# Patient Record
Sex: MALE | Race: WHITE | Employment: OTHER | URBAN - METROPOLITAN AREA
[De-identification: names, ages, dates, MRNs, and addresses within clinical notes are randomized per-mention and may not be internally consistent; named-entity substitution may affect disease eponyms.]

---

## 2017-01-01 ENCOUNTER — APPOINTMENT (OUTPATIENT)
Dept: GENERAL RADIOLOGY | Facility: HOSPITAL | Age: 72
DRG: 309 | End: 2017-01-01
Attending: HOSPITALIST
Payer: MEDICARE

## 2017-01-01 ENCOUNTER — ANESTHESIA (OUTPATIENT)
Dept: ENDOSCOPY | Facility: HOSPITAL | Age: 72
End: 2017-01-01
Payer: MEDICARE

## 2017-01-01 ENCOUNTER — NURSE NAVIGATOR ENCOUNTER (OUTPATIENT)
Dept: HEMATOLOGY/ONCOLOGY | Facility: HOSPITAL | Age: 72
End: 2017-01-01

## 2017-01-01 ENCOUNTER — PATIENT OUTREACH (OUTPATIENT)
Dept: HEMATOLOGY/ONCOLOGY | Facility: HOSPITAL | Age: 72
End: 2017-01-01

## 2017-01-01 ENCOUNTER — TELEPHONE (OUTPATIENT)
Dept: HEMATOLOGY/ONCOLOGY | Facility: HOSPITAL | Age: 72
End: 2017-01-01

## 2017-01-01 ENCOUNTER — HOSPITAL ENCOUNTER (OUTPATIENT)
Dept: CT IMAGING | Age: 72
Discharge: HOME | End: 2017-01-01
Attending: INTERNAL MEDICINE | Admitting: INTERNAL MEDICINE
Payer: MEDICARE

## 2017-01-01 ENCOUNTER — TELEPHONE (OUTPATIENT)
Dept: PULMONOLOGY | Facility: CLINIC | Age: 72
End: 2017-01-01

## 2017-01-01 ENCOUNTER — LAB ENCOUNTER (OUTPATIENT)
Dept: LAB | Age: 72
End: 2017-01-01
Attending: INTERNAL MEDICINE
Payer: MEDICARE

## 2017-01-01 ENCOUNTER — TELEPHONE (OUTPATIENT)
Dept: INTERNAL MEDICINE CLINIC | Facility: CLINIC | Age: 72
End: 2017-01-01

## 2017-01-01 ENCOUNTER — HOSPITAL ENCOUNTER (OUTPATIENT)
Dept: NUCLEAR MEDICINE | Facility: HOSPITAL | Age: 72
Discharge: HOME | End: 2017-01-01
Attending: INTERNAL MEDICINE
Payer: MEDICARE

## 2017-01-01 ENCOUNTER — APPOINTMENT (OUTPATIENT)
Dept: HEMATOLOGY/ONCOLOGY | Facility: HOSPITAL | Age: 72
End: 2017-01-01
Attending: PHYSICIAN ASSISTANT
Payer: MEDICARE

## 2017-01-01 ENCOUNTER — OFFICE VISIT (OUTPATIENT)
Dept: PULMONOLOGY | Facility: CLINIC | Age: 72
End: 2017-01-01

## 2017-01-01 ENCOUNTER — DOCUMENTATION ONLY (OUTPATIENT)
Dept: HEMATOLOGY/ONCOLOGY | Facility: HOSPITAL | Age: 72
End: 2017-01-01

## 2017-01-01 ENCOUNTER — OFFICE VISIT (OUTPATIENT)
Dept: INTERNAL MEDICINE CLINIC | Facility: CLINIC | Age: 72
End: 2017-01-01

## 2017-01-01 ENCOUNTER — APPOINTMENT (OUTPATIENT)
Dept: GENERAL RADIOLOGY | Facility: HOSPITAL | Age: 72
DRG: 309 | End: 2017-01-01
Attending: EMERGENCY MEDICINE
Payer: MEDICARE

## 2017-01-01 ENCOUNTER — SURGERY (OUTPATIENT)
Age: 72
End: 2017-01-01

## 2017-01-01 ENCOUNTER — HOSPITAL ENCOUNTER (OUTPATIENT)
Dept: MRI IMAGING | Age: 72
Discharge: HOME | End: 2017-01-01
Attending: INTERNAL MEDICINE
Payer: MEDICARE

## 2017-01-01 ENCOUNTER — APPOINTMENT (OUTPATIENT)
Dept: CT IMAGING | Facility: HOSPITAL | Age: 72
DRG: 309 | End: 2017-01-01
Attending: EMERGENCY MEDICINE
Payer: MEDICARE

## 2017-01-01 ENCOUNTER — LAB ENCOUNTER (OUTPATIENT)
Dept: LAB | Age: 72
DRG: 309 | End: 2017-01-01
Attending: INTERNAL MEDICINE
Payer: MEDICARE

## 2017-01-01 ENCOUNTER — OFFICE VISIT (OUTPATIENT)
Dept: HEMATOLOGY/ONCOLOGY | Facility: HOSPITAL | Age: 72
End: 2017-01-01
Attending: INTERNAL MEDICINE
Payer: MEDICARE

## 2017-01-01 ENCOUNTER — TELEPHONE (OUTPATIENT)
Dept: OPHTHALMOLOGY | Facility: CLINIC | Age: 72
End: 2017-01-01

## 2017-01-01 ENCOUNTER — HOSPITAL ENCOUNTER (OUTPATIENT)
Dept: RESPIRATORY THERAPY | Facility: HOSPITAL | Age: 72
Discharge: HOME | End: 2017-01-01
Attending: INTERNAL MEDICINE
Payer: MEDICARE

## 2017-01-01 ENCOUNTER — ANESTHESIA EVENT (OUTPATIENT)
Dept: ENDOSCOPY | Facility: HOSPITAL | Age: 72
End: 2017-01-01
Payer: MEDICARE

## 2017-01-01 ENCOUNTER — APPOINTMENT (OUTPATIENT)
Dept: CV DIAGNOSTICS | Facility: HOSPITAL | Age: 72
DRG: 309 | End: 2017-01-01
Attending: EMERGENCY MEDICINE
Payer: MEDICARE

## 2017-01-01 ENCOUNTER — HOSPITAL ENCOUNTER (OUTPATIENT)
Facility: HOSPITAL | Age: 72
Setting detail: HOSPITAL OUTPATIENT SURGERY
Discharge: HOME | End: 2017-01-01
Attending: INTERNAL MEDICINE | Admitting: INTERNAL MEDICINE
Payer: MEDICARE

## 2017-01-01 ENCOUNTER — TELEPHONE (OUTPATIENT)
Dept: CARDIOLOGY UNIT | Facility: HOSPITAL | Age: 72
End: 2017-01-01

## 2017-01-01 ENCOUNTER — HOSPITAL ENCOUNTER (OUTPATIENT)
Dept: GENERAL RADIOLOGY | Age: 72
Discharge: HOME | End: 2017-01-01
Attending: INTERNAL MEDICINE | Admitting: INTERNAL MEDICINE
Payer: MEDICARE

## 2017-01-01 ENCOUNTER — HOSPITAL ENCOUNTER (INPATIENT)
Facility: HOSPITAL | Age: 72
LOS: 3 days | Discharge: NURSING, REHAB OR LONG-TERM CARE | DRG: 309 | End: 2017-01-01
Attending: EMERGENCY MEDICINE | Admitting: HOSPITALIST
Payer: MEDICARE

## 2017-01-01 VITALS
DIASTOLIC BLOOD PRESSURE: 70 MMHG | SYSTOLIC BLOOD PRESSURE: 110 MMHG | BODY MASS INDEX: 23.3 KG/M2 | WEIGHT: 145 LBS | HEART RATE: 112 BPM | HEIGHT: 66 IN | RESPIRATION RATE: 20 BRPM | OXYGEN SATURATION: 96 %

## 2017-01-01 VITALS
SYSTOLIC BLOOD PRESSURE: 137 MMHG | HEART RATE: 70 BPM | BODY MASS INDEX: 21.74 KG/M2 | OXYGEN SATURATION: 94 % | WEIGHT: 138.5 LBS | DIASTOLIC BLOOD PRESSURE: 63 MMHG | TEMPERATURE: 98 F | RESPIRATION RATE: 18 BRPM | HEIGHT: 67 IN

## 2017-01-01 VITALS
BODY MASS INDEX: 24.48 KG/M2 | RESPIRATION RATE: 20 BRPM | HEIGHT: 67 IN | SYSTOLIC BLOOD PRESSURE: 120 MMHG | HEART RATE: 105 BPM | WEIGHT: 156 LBS | DIASTOLIC BLOOD PRESSURE: 76 MMHG | TEMPERATURE: 98 F

## 2017-01-01 VITALS
HEART RATE: 90 BPM | TEMPERATURE: 99 F | OXYGEN SATURATION: 94 % | DIASTOLIC BLOOD PRESSURE: 65 MMHG | BODY MASS INDEX: 23.63 KG/M2 | SYSTOLIC BLOOD PRESSURE: 118 MMHG | WEIGHT: 147 LBS | HEIGHT: 66 IN | RESPIRATION RATE: 26 BRPM

## 2017-01-01 VITALS
TEMPERATURE: 97 F | WEIGHT: 154 LBS | DIASTOLIC BLOOD PRESSURE: 72 MMHG | HEIGHT: 67 IN | RESPIRATION RATE: 20 BRPM | SYSTOLIC BLOOD PRESSURE: 118 MMHG | BODY MASS INDEX: 24.17 KG/M2 | HEART RATE: 90 BPM

## 2017-01-01 VITALS
SYSTOLIC BLOOD PRESSURE: 83 MMHG | TEMPERATURE: 98 F | BODY MASS INDEX: 22.82 KG/M2 | HEIGHT: 66 IN | HEART RATE: 96 BPM | DIASTOLIC BLOOD PRESSURE: 51 MMHG | RESPIRATION RATE: 16 BRPM | WEIGHT: 142 LBS

## 2017-01-01 VITALS
HEART RATE: 98 BPM | SYSTOLIC BLOOD PRESSURE: 144 MMHG | HEIGHT: 66 IN | TEMPERATURE: 98 F | BODY MASS INDEX: 22.66 KG/M2 | WEIGHT: 141 LBS | DIASTOLIC BLOOD PRESSURE: 63 MMHG | OXYGEN SATURATION: 95 % | RESPIRATION RATE: 24 BRPM

## 2017-01-01 VITALS
SYSTOLIC BLOOD PRESSURE: 112 MMHG | RESPIRATION RATE: 16 BRPM | OXYGEN SATURATION: 94 % | DIASTOLIC BLOOD PRESSURE: 66 MMHG | HEART RATE: 99 BPM | WEIGHT: 154 LBS | BODY MASS INDEX: 24.75 KG/M2 | HEIGHT: 66 IN

## 2017-01-01 VITALS
WEIGHT: 145 LBS | OXYGEN SATURATION: 93 % | BODY MASS INDEX: 23.3 KG/M2 | TEMPERATURE: 98 F | RESPIRATION RATE: 23 BRPM | DIASTOLIC BLOOD PRESSURE: 65 MMHG | HEART RATE: 82 BPM | SYSTOLIC BLOOD PRESSURE: 133 MMHG | HEIGHT: 66 IN

## 2017-01-01 VITALS
RESPIRATION RATE: 18 BRPM | DIASTOLIC BLOOD PRESSURE: 43 MMHG | SYSTOLIC BLOOD PRESSURE: 115 MMHG | TEMPERATURE: 98 F | HEART RATE: 78 BPM | OXYGEN SATURATION: 93 %

## 2017-01-01 DIAGNOSIS — E78.00 PURE HYPERCHOLESTEROLEMIA: ICD-10-CM

## 2017-01-01 DIAGNOSIS — C78.7 LIVER METASTASIS (HCC): ICD-10-CM

## 2017-01-01 DIAGNOSIS — J44.9 CHRONIC OBSTRUCTIVE PULMONARY DISEASE, UNSPECIFIED COPD TYPE (HCC): Primary | ICD-10-CM

## 2017-01-01 DIAGNOSIS — C34.91 MALIGNANT NEOPLASM OF RIGHT LUNG, UNSPECIFIED PART OF LUNG (HCC): Primary | ICD-10-CM

## 2017-01-01 DIAGNOSIS — E11.22 TYPE 2 DIABETES MELLITUS WITH CHRONIC KIDNEY DISEASE, WITH LONG-TERM CURRENT USE OF INSULIN, UNSPECIFIED CKD STAGE (HCC): ICD-10-CM

## 2017-01-01 DIAGNOSIS — R77.8 ELEVATED TROPONIN: ICD-10-CM

## 2017-01-01 DIAGNOSIS — R04.2 HEMOPTYSIS: ICD-10-CM

## 2017-01-01 DIAGNOSIS — R91.8 LUNG MASS: ICD-10-CM

## 2017-01-01 DIAGNOSIS — Z23 NEED FOR INFLUENZA VACCINATION: ICD-10-CM

## 2017-01-01 DIAGNOSIS — J44.9 CHRONIC OBSTRUCTIVE PULMONARY DISEASE, UNSPECIFIED COPD TYPE (HCC): ICD-10-CM

## 2017-01-01 DIAGNOSIS — R05.9 COUGH: ICD-10-CM

## 2017-01-01 DIAGNOSIS — R06.00 DOE (DYSPNEA ON EXERTION): ICD-10-CM

## 2017-01-01 DIAGNOSIS — Z23 NEED FOR PNEUMOCOCCAL VACCINATION: ICD-10-CM

## 2017-01-01 DIAGNOSIS — D72.829 LEUKOCYTOSIS, UNSPECIFIED TYPE: ICD-10-CM

## 2017-01-01 DIAGNOSIS — E11.22 TYPE 2 DIABETES MELLITUS WITH CHRONIC KIDNEY DISEASE, WITH LONG-TERM CURRENT USE OF INSULIN, UNSPECIFIED CKD STAGE (HCC): Primary | ICD-10-CM

## 2017-01-01 DIAGNOSIS — D72.829 LEUKOCYTOSIS, UNSPECIFIED TYPE: Primary | ICD-10-CM

## 2017-01-01 DIAGNOSIS — N28.9 FUNCTION KIDNEY DECREASED: Primary | ICD-10-CM

## 2017-01-01 DIAGNOSIS — C34.90 LUNG CANCER (HCC): ICD-10-CM

## 2017-01-01 DIAGNOSIS — H17.9 CORNEAL SCAR, RIGHT EYE: ICD-10-CM

## 2017-01-01 DIAGNOSIS — D50.9 IRON DEFICIENCY ANEMIA, UNSPECIFIED IRON DEFICIENCY ANEMIA TYPE: Primary | ICD-10-CM

## 2017-01-01 DIAGNOSIS — E11.9 TYPE 2 DIABETES MELLITUS WITHOUT COMPLICATION, WITHOUT LONG-TERM CURRENT USE OF INSULIN (HCC): ICD-10-CM

## 2017-01-01 DIAGNOSIS — Z01.00 DIABETIC EYE EXAM (HCC): Primary | ICD-10-CM

## 2017-01-01 DIAGNOSIS — D50.8 OTHER IRON DEFICIENCY ANEMIA: ICD-10-CM

## 2017-01-01 DIAGNOSIS — R06.00 DYSPNEA ON EXERTION: ICD-10-CM

## 2017-01-01 DIAGNOSIS — Z79.4 TYPE 2 DIABETES MELLITUS WITH CHRONIC KIDNEY DISEASE, WITH LONG-TERM CURRENT USE OF INSULIN, UNSPECIFIED CKD STAGE (HCC): ICD-10-CM

## 2017-01-01 DIAGNOSIS — Z79.4 TYPE 2 DIABETES MELLITUS WITH CHRONIC KIDNEY DISEASE, WITH LONG-TERM CURRENT USE OF INSULIN, UNSPECIFIED CKD STAGE (HCC): Primary | ICD-10-CM

## 2017-01-01 DIAGNOSIS — E55.9 VITAMIN D DEFICIENCY: ICD-10-CM

## 2017-01-01 DIAGNOSIS — C34.91 MALIGNANT NEOPLASM OF RIGHT LUNG, UNSPECIFIED PART OF LUNG (HCC): ICD-10-CM

## 2017-01-01 DIAGNOSIS — I48.92 ATRIAL FLUTTER, UNSPECIFIED TYPE (HCC): ICD-10-CM

## 2017-01-01 DIAGNOSIS — Z12.5 SCREENING PSA (PROSTATE SPECIFIC ANTIGEN): ICD-10-CM

## 2017-01-01 DIAGNOSIS — D50.9 IRON DEFICIENCY ANEMIA, UNSPECIFIED IRON DEFICIENCY ANEMIA TYPE: ICD-10-CM

## 2017-01-01 DIAGNOSIS — H26.8 OTHER CATARACT: Primary | ICD-10-CM

## 2017-01-01 DIAGNOSIS — H26.9 CATARACT, UNSPECIFIED CATARACT TYPE, UNSPECIFIED LATERALITY: ICD-10-CM

## 2017-01-01 DIAGNOSIS — Z00.00 MEDICARE ANNUAL WELLNESS VISIT, SUBSEQUENT: ICD-10-CM

## 2017-01-01 DIAGNOSIS — R63.4 WEIGHT LOSS: ICD-10-CM

## 2017-01-01 DIAGNOSIS — Z95.5 S/P CORONARY ARTERY STENT PLACEMENT: ICD-10-CM

## 2017-01-01 DIAGNOSIS — Z96.1 PSEUDOPHAKIA OF RIGHT EYE: ICD-10-CM

## 2017-01-01 DIAGNOSIS — R04.2 HEMOPTYSIS: Primary | ICD-10-CM

## 2017-01-01 DIAGNOSIS — E11.9 DIABETIC EYE EXAM (HCC): Primary | ICD-10-CM

## 2017-01-01 DIAGNOSIS — E83.42 HYPOMAGNESEMIA: Primary | ICD-10-CM

## 2017-01-01 DIAGNOSIS — Z51.11 ENCOUNTER FOR CHEMOTHERAPY MANAGEMENT: ICD-10-CM

## 2017-01-01 DIAGNOSIS — N39.0 URINARY TRACT INFECTION WITHOUT HEMATURIA, SITE UNSPECIFIED: ICD-10-CM

## 2017-01-01 LAB
25(OH)D3 SERPL-MCNC: 22.9 NG/ML
ALBUMIN SERPL BCP-MCNC: 3.1 G/DL (ref 3.5–4.8)
ALBUMIN/GLOB SERPL: 0.8 {RATIO} (ref 1–2)
ALP SERPL-CCNC: 68 U/L (ref 32–100)
ALT SERPL-CCNC: 22 U/L (ref 17–63)
ANION GAP SERPL CALC-SCNC: 11 MMOL/L (ref 0–18)
AST SERPL-CCNC: 23 U/L (ref 15–41)
BACTERIA UR QL AUTO: NEGATIVE /HPF
BASOPHILS # BLD: 0.1 K/UL (ref 0–0.2)
BASOPHILS # BLD: 0.1 K/UL (ref 0–0.2)
BASOPHILS NFR BLD: 1 %
BASOPHILS NFR BLD: 1 %
BASOPHILS NFR FLD: 0 %
BILIRUB SERPL-MCNC: 0.3 MG/DL (ref 0.3–1.2)
BUN SERPL-MCNC: 24 MG/DL (ref 8–20)
BUN/CREAT SERPL: 16.8 (ref 10–20)
CALCIUM SERPL-MCNC: 9.3 MG/DL (ref 8.5–10.5)
CHLORIDE SERPL-SCNC: 102 MMOL/L (ref 95–110)
CHOLEST SERPL-MCNC: 196 MG/DL (ref 110–200)
CO2 SERPL-SCNC: 21 MMOL/L (ref 22–32)
CREAT SERPL-MCNC: 1.43 MG/DL (ref 0.5–1.5)
CREAT UR-MCNC: 98 MG/DL
EOSINOPHIL # BLD: 0 K/UL (ref 0–0.7)
EOSINOPHIL # BLD: 0 K/UL (ref 0–0.7)
EOSINOPHIL NFR BLD: 0 %
EOSINOPHIL NFR BLD: 0 %
EOSINOPHIL NFR FLD: 0 %
ERYTHROCYTE [DISTWIDTH] IN BLOOD BY AUTOMATED COUNT: 13 % (ref 11–15)
ERYTHROCYTE [DISTWIDTH] IN BLOOD BY AUTOMATED COUNT: 13.6 % (ref 11–15)
GLOBULIN PLAS-MCNC: 3.8 G/DL (ref 2.5–3.7)
GLUCOSE BLDC GLUCOMTR-MCNC: 182 MG/DL (ref 70–99)
GLUCOSE SERPL-MCNC: 390 MG/DL (ref 70–99)
HBA1C MFR BLD: 7.9 % (ref 4–6)
HCT VFR BLD AUTO: 30.2 % (ref 41–52)
HCT VFR BLD AUTO: 32.5 % (ref 41–52)
HDLC SERPL-MCNC: 30 MG/DL
HGB BLD-MCNC: 10.1 G/DL (ref 13.5–17.5)
HGB BLD-MCNC: 10.7 G/DL (ref 13.5–17.5)
LDLC SERPL CALC-MCNC: 127 MG/DL (ref 0–99)
LYMPHOCYTES # BLD: 1.1 K/UL (ref 1–4)
LYMPHOCYTES # BLD: 2 K/UL (ref 1–4)
LYMPHOCYTES NFR BLD: 15 %
LYMPHOCYTES NFR BLD: 7 %
LYMPHOCYTES NFR FLD: 5 %
MCH RBC QN AUTO: 31.9 PG (ref 27–32)
MCH RBC QN AUTO: 32.1 PG (ref 27–32)
MCHC RBC AUTO-ENTMCNC: 32.8 G/DL (ref 32–37)
MCHC RBC AUTO-ENTMCNC: 33.5 G/DL (ref 32–37)
MCV RBC AUTO: 95.8 FL (ref 80–100)
MCV RBC AUTO: 97.2 FL (ref 80–100)
MICROALBUMIN UR-MCNC: 58.6 MG/DL (ref 0–1.8)
MICROALBUMIN/CREAT UR: 598 MG/G{CREAT} (ref 0–20)
MONOCYTES # BLD: 0.5 K/UL (ref 0–1)
MONOCYTES # BLD: 1.1 K/UL (ref 0–1)
MONOCYTES NFR BLD: 3 %
MONOCYTES NFR BLD: 8 %
MONOCYTES NFR FLD: 7 %
NEUTROPHILS # BLD AUTO: 10.7 K/UL (ref 1.8–7.7)
NEUTROPHILS # BLD AUTO: 14.3 K/UL (ref 1.8–7.7)
NEUTROPHILS NFR BLD: 76 %
NEUTROPHILS NFR BLD: 89 %
NEUTROPHILS NFR FLD: 88 %
NONHDLC SERPL-MCNC: 166 MG/DL
OSMOLALITY UR CALC.SUM OF ELEC: 298 MOSM/KG (ref 275–295)
PLATELET # BLD AUTO: 414 K/UL (ref 140–400)
PLATELET # BLD AUTO: 420 K/UL (ref 140–400)
PMV BLD AUTO: 6.5 FL (ref 7.4–10.3)
PMV BLD AUTO: 7 FL (ref 7.4–10.3)
POTASSIUM SERPL-SCNC: 4.5 MMOL/L (ref 3.3–5.1)
PROT SERPL-MCNC: 6.9 G/DL (ref 5.9–8.4)
RBC # BLD AUTO: 3.15 M/UL (ref 4.5–5.9)
RBC # BLD AUTO: 3.34 M/UL (ref 4.5–5.9)
RBC # FLD: ABNORMAL /CUMM (ref ?–1)
RBC #/AREA URNS AUTO: <1 /HPF
SODIUM SERPL-SCNC: 134 MMOL/L (ref 136–144)
TRIGL SERPL-MCNC: 196 MG/DL (ref 1–149)
TSH SERPL-ACNC: 1.2 UIU/ML (ref 0.45–5.33)
WBC # BLD AUTO: 14 K/UL (ref 4–11)
WBC # BLD AUTO: 16 K/UL (ref 4–11)
WBC # FLD: 403 /CUMM (ref ?–1)
WBC #/AREA URNS AUTO: 1 /HPF
WBC OTHER NFR FLD: 0 %

## 2017-01-01 PROCEDURE — 82728 ASSAY OF FERRITIN: CPT

## 2017-01-01 PROCEDURE — 85025 COMPLETE CBC W/AUTO DIFF WBC: CPT

## 2017-01-01 PROCEDURE — 96413 CHEMO IV INFUSION 1 HR: CPT

## 2017-01-01 PROCEDURE — 36415 COLL VENOUS BLD VENIPUNCTURE: CPT

## 2017-01-01 PROCEDURE — 99223 1ST HOSP IP/OBS HIGH 75: CPT | Performed by: HOSPITALIST

## 2017-01-01 PROCEDURE — 71010 XR CHEST AP PORTABLE  (CPT=71010): CPT | Performed by: EMERGENCY MEDICINE

## 2017-01-01 PROCEDURE — 82570 ASSAY OF URINE CREATININE: CPT

## 2017-01-01 PROCEDURE — 94726 PLETHYSMOGRAPHY LUNG VOLUMES: CPT | Performed by: INTERNAL MEDICINE

## 2017-01-01 PROCEDURE — 07974ZX DRAINAGE OF THORAX LYMPHATIC, PERCUTANEOUS ENDOSCOPIC APPROACH, DIAGNOSTIC: ICD-10-PCS | Performed by: INTERNAL MEDICINE

## 2017-01-01 PROCEDURE — G0463 HOSPITAL OUTPT CLINIC VISIT: HCPCS | Performed by: INTERNAL MEDICINE

## 2017-01-01 PROCEDURE — 99233 SBSQ HOSP IP/OBS HIGH 50: CPT | Performed by: HOSPITALIST

## 2017-01-01 PROCEDURE — 99222 1ST HOSP IP/OBS MODERATE 55: CPT | Performed by: INTERNAL MEDICINE

## 2017-01-01 PROCEDURE — 99214 OFFICE O/P EST MOD 30 MIN: CPT | Performed by: INTERNAL MEDICINE

## 2017-01-01 PROCEDURE — 80061 LIPID PANEL: CPT

## 2017-01-01 PROCEDURE — 71020 XR CHEST PA + LAT CHEST (CPT=71020): CPT | Performed by: HOSPITALIST

## 2017-01-01 PROCEDURE — 90662 IIV NO PRSV INCREASED AG IM: CPT | Performed by: INTERNAL MEDICINE

## 2017-01-01 PROCEDURE — 99239 HOSP IP/OBS DSCHRG MGMT >30: CPT | Performed by: HOSPITALIST

## 2017-01-01 PROCEDURE — 84443 ASSAY THYROID STIM HORMONE: CPT

## 2017-01-01 PROCEDURE — 84466 ASSAY OF TRANSFERRIN: CPT

## 2017-01-01 PROCEDURE — 83036 HEMOGLOBIN GLYCOSYLATED A1C: CPT

## 2017-01-01 PROCEDURE — 81015 MICROSCOPIC EXAM OF URINE: CPT

## 2017-01-01 PROCEDURE — 82043 UR ALBUMIN QUANTITATIVE: CPT

## 2017-01-01 PROCEDURE — 83540 ASSAY OF IRON: CPT

## 2017-01-01 PROCEDURE — 99215 OFFICE O/P EST HI 40 MIN: CPT | Performed by: INTERNAL MEDICINE

## 2017-01-01 PROCEDURE — G0463 HOSPITAL OUTPT CLINIC VISIT: HCPCS | Performed by: PHYSICIAN ASSISTANT

## 2017-01-01 PROCEDURE — 82607 VITAMIN B-12: CPT

## 2017-01-01 PROCEDURE — 82306 VITAMIN D 25 HYDROXY: CPT

## 2017-01-01 PROCEDURE — 93306 TTE W/DOPPLER COMPLETE: CPT | Performed by: EMERGENCY MEDICINE

## 2017-01-01 PROCEDURE — 82962 GLUCOSE BLOOD TEST: CPT

## 2017-01-01 PROCEDURE — 31652 BRONCH EBUS SAMPLNG 1/2 NODE: CPT | Performed by: INTERNAL MEDICINE

## 2017-01-01 PROCEDURE — 94060 EVALUATION OF WHEEZING: CPT | Performed by: INTERNAL MEDICINE

## 2017-01-01 PROCEDURE — G0439 PPPS, SUBSEQ VISIT: HCPCS | Performed by: INTERNAL MEDICINE

## 2017-01-01 PROCEDURE — 80053 COMPREHEN METABOLIC PANEL: CPT

## 2017-01-01 PROCEDURE — 71260 CT THORAX DX C+: CPT | Performed by: EMERGENCY MEDICINE

## 2017-01-01 PROCEDURE — G0009 ADMIN PNEUMOCOCCAL VACCINE: HCPCS | Performed by: INTERNAL MEDICINE

## 2017-01-01 PROCEDURE — 70553 MRI BRAIN STEM W/O & W/DYE: CPT | Performed by: INTERNAL MEDICINE

## 2017-01-01 PROCEDURE — 99232 SBSQ HOSP IP/OBS MODERATE 35: CPT | Performed by: INTERNAL MEDICINE

## 2017-01-01 PROCEDURE — 71020 XR CHEST PA + LAT CHEST (CPT=71020): CPT | Performed by: INTERNAL MEDICINE

## 2017-01-01 PROCEDURE — 87086 URINE CULTURE/COLONY COUNT: CPT

## 2017-01-01 PROCEDURE — 99215 OFFICE O/P EST HI 40 MIN: CPT | Performed by: PHYSICIAN ASSISTANT

## 2017-01-01 PROCEDURE — G0008 ADMIN INFLUENZA VIRUS VAC: HCPCS | Performed by: INTERNAL MEDICINE

## 2017-01-01 PROCEDURE — 78815 PET IMAGE W/CT SKULL-THIGH: CPT | Performed by: INTERNAL MEDICINE

## 2017-01-01 PROCEDURE — 0B9C8ZX DRAINAGE OF RIGHT UPPER LUNG LOBE, VIA NATURAL OR ARTIFICIAL OPENING ENDOSCOPIC, DIAGNOSTIC: ICD-10-PCS | Performed by: INTERNAL MEDICINE

## 2017-01-01 PROCEDURE — 90670 PCV13 VACCINE IM: CPT | Performed by: INTERNAL MEDICINE

## 2017-01-01 PROCEDURE — 99205 OFFICE O/P NEW HI 60 MIN: CPT | Performed by: INTERNAL MEDICINE

## 2017-01-01 PROCEDURE — A9575 INJ GADOTERATE MEGLUMI 0.1ML: HCPCS | Performed by: INTERNAL MEDICINE

## 2017-01-01 PROCEDURE — 71260 CT THORAX DX C+: CPT | Performed by: INTERNAL MEDICINE

## 2017-01-01 PROCEDURE — 82746 ASSAY OF FOLIC ACID SERUM: CPT

## 2017-01-01 PROCEDURE — 94729 DIFFUSING CAPACITY: CPT | Performed by: INTERNAL MEDICINE

## 2017-01-01 RX ORDER — ATORVASTATIN CALCIUM 20 MG/1
20 TABLET, FILM COATED ORAL NIGHTLY
Qty: 30 TABLET | Refills: 2 | Status: SHIPPED | OUTPATIENT
Start: 2017-01-01

## 2017-01-01 RX ORDER — BUSPIRONE HYDROCHLORIDE 5 MG/1
TABLET ORAL
Qty: 180 TABLET | Refills: 2 | Status: CANCELLED | OUTPATIENT
Start: 2017-01-01

## 2017-01-01 RX ORDER — CLOPIDOGREL BISULFATE 75 MG/1
TABLET ORAL
Qty: 90 TABLET | Refills: 3 | Status: SHIPPED | OUTPATIENT
Start: 2017-01-01 | End: 2017-01-01

## 2017-01-01 RX ORDER — CHOLECALCIFEROL (VITAMIN D3) 50 MCG
2000 TABLET ORAL DAILY
COMMUNITY

## 2017-01-01 RX ORDER — MORPHINE SULFATE 2 MG/ML
1 INJECTION, SOLUTION INTRAMUSCULAR; INTRAVENOUS EVERY 2 HOUR PRN
Status: DISCONTINUED | OUTPATIENT
Start: 2017-01-01 | End: 2017-01-01

## 2017-01-01 RX ORDER — ASPIRIN 81 MG/1
81 TABLET, CHEWABLE ORAL DAILY
Status: DISCONTINUED | OUTPATIENT
Start: 2017-01-01 | End: 2017-01-01

## 2017-01-01 RX ORDER — ATORVASTATIN CALCIUM 20 MG/1
20 TABLET, FILM COATED ORAL NIGHTLY
Qty: 30 TABLET | Refills: 2 | Status: SHIPPED | OUTPATIENT
Start: 2017-01-01 | End: 2017-01-01

## 2017-01-01 RX ORDER — HYDROMORPHONE HYDROCHLORIDE 1 MG/ML
0.4 INJECTION, SOLUTION INTRAMUSCULAR; INTRAVENOUS; SUBCUTANEOUS EVERY 5 MIN PRN
Status: DISCONTINUED | OUTPATIENT
Start: 2017-01-01 | End: 2017-01-01 | Stop reason: HOSPADM

## 2017-01-01 RX ORDER — HEPARIN SODIUM AND DEXTROSE 10000; 5 [USP'U]/100ML; G/100ML
INJECTION INTRAVENOUS CONTINUOUS
Status: DISCONTINUED | OUTPATIENT
Start: 2017-01-01 | End: 2017-01-01

## 2017-01-01 RX ORDER — SODIUM CHLORIDE 9 MG/ML
INJECTION, SOLUTION INTRAVENOUS
Status: DISCONTINUED
Start: 2017-01-01 | End: 2017-01-01

## 2017-01-01 RX ORDER — ONDANSETRON 2 MG/ML
4 INJECTION INTRAMUSCULAR; INTRAVENOUS ONCE AS NEEDED
Status: DISCONTINUED | OUTPATIENT
Start: 2017-01-01 | End: 2017-01-01 | Stop reason: HOSPADM

## 2017-01-01 RX ORDER — METOPROLOL TARTRATE 5 MG/5ML
2.5 INJECTION INTRAVENOUS ONCE
Status: DISCONTINUED | OUTPATIENT
Start: 2017-01-01 | End: 2017-01-01 | Stop reason: HOSPADM

## 2017-01-01 RX ORDER — ROCURONIUM BROMIDE 10 MG/ML
INJECTION, SOLUTION INTRAVENOUS AS NEEDED
Status: DISCONTINUED | OUTPATIENT
Start: 2017-01-01 | End: 2017-01-01 | Stop reason: SURG

## 2017-01-01 RX ORDER — BUSPIRONE HYDROCHLORIDE 5 MG/1
5 TABLET ORAL 2 TIMES DAILY PRN
Status: DISCONTINUED | OUTPATIENT
Start: 2017-01-01 | End: 2017-01-01

## 2017-01-01 RX ORDER — ONDANSETRON 2 MG/ML
4 INJECTION INTRAMUSCULAR; INTRAVENOUS EVERY 6 HOURS PRN
Status: DISCONTINUED | OUTPATIENT
Start: 2017-01-01 | End: 2017-01-01

## 2017-01-01 RX ORDER — NALOXONE HYDROCHLORIDE 0.4 MG/ML
80 INJECTION, SOLUTION INTRAMUSCULAR; INTRAVENOUS; SUBCUTANEOUS AS NEEDED
Status: DISCONTINUED | OUTPATIENT
Start: 2017-01-01 | End: 2017-01-01 | Stop reason: HOSPADM

## 2017-01-01 RX ORDER — MAGNESIUM OXIDE 400 MG (241.3 MG MAGNESIUM) TABLET
400 TABLET 2 TIMES DAILY
Status: DISCONTINUED | OUTPATIENT
Start: 2017-01-01 | End: 2017-01-01

## 2017-01-01 RX ORDER — MORPHINE SULFATE 2 MG/ML
2 INJECTION, SOLUTION INTRAMUSCULAR; INTRAVENOUS EVERY 2 HOUR PRN
Status: DISCONTINUED | OUTPATIENT
Start: 2017-01-01 | End: 2017-01-01

## 2017-01-01 RX ORDER — NITROGLYCERIN 0.4 MG/1
0.4 TABLET SUBLINGUAL EVERY 5 MIN PRN
Qty: 30 TABLET | Refills: 0 | Status: SHIPPED | OUTPATIENT
Start: 2017-01-01 | End: 2017-01-01

## 2017-01-01 RX ORDER — PAROXETINE HYDROCHLORIDE 25 MG/1
TABLET, FILM COATED, EXTENDED RELEASE ORAL
Qty: 180 TABLET | Refills: 2 | Status: CANCELLED | OUTPATIENT
Start: 2017-01-01

## 2017-01-01 RX ORDER — SENNOSIDES 8.6 MG
17.2 TABLET ORAL NIGHTLY
Status: DISCONTINUED | OUTPATIENT
Start: 2017-01-01 | End: 2017-01-01

## 2017-01-01 RX ORDER — AMIODARONE HYDROCHLORIDE 200 MG/1
200 TABLET ORAL DAILY
Qty: 30 TABLET | Refills: 1 | Status: SHIPPED | OUTPATIENT
Start: 2017-01-01

## 2017-01-01 RX ORDER — PHENYLEPHRINE HCL 10 MG/ML
VIAL (ML) INJECTION AS NEEDED
Status: DISCONTINUED | OUTPATIENT
Start: 2017-01-01 | End: 2017-01-01 | Stop reason: SURG

## 2017-01-01 RX ORDER — NITROGLYCERIN 0.4 MG/1
0.4 TABLET SUBLINGUAL EVERY 5 MIN PRN
Qty: 30 TABLET | Refills: 0 | Status: SHIPPED | OUTPATIENT
Start: 2017-01-01

## 2017-01-01 RX ORDER — MORPHINE SULFATE 10 MG/ML
6 INJECTION, SOLUTION INTRAMUSCULAR; INTRAVENOUS EVERY 10 MIN PRN
Status: DISCONTINUED | OUTPATIENT
Start: 2017-01-01 | End: 2017-01-01 | Stop reason: HOSPADM

## 2017-01-01 RX ORDER — LIDOCAINE HYDROCHLORIDE 10 MG/ML
INJECTION, SOLUTION EPIDURAL; INFILTRATION; INTRACAUDAL; PERINEURAL AS NEEDED
Status: DISCONTINUED | OUTPATIENT
Start: 2017-01-01 | End: 2017-01-01 | Stop reason: SURG

## 2017-01-01 RX ORDER — ADENOSINE 3 MG/ML
6 INJECTION, SOLUTION INTRAVENOUS ONCE
Status: COMPLETED | OUTPATIENT
Start: 2017-01-01 | End: 2017-01-01

## 2017-01-01 RX ORDER — HEPARIN SODIUM AND DEXTROSE 10000; 5 [USP'U]/100ML; G/100ML
12 INJECTION INTRAVENOUS ONCE
Status: COMPLETED | OUTPATIENT
Start: 2017-01-01 | End: 2017-01-01

## 2017-01-01 RX ORDER — FLUTICASONE PROPIONATE AND SALMETEROL 500; 50 UG/1; UG/1
POWDER RESPIRATORY (INHALATION)
Qty: 1 EACH | Refills: 5 | Status: SHIPPED | OUTPATIENT
Start: 2017-01-01 | End: 2017-01-01 | Stop reason: ALTCHOICE

## 2017-01-01 RX ORDER — TIOTROPIUM BROMIDE 18 UG/1
CAPSULE ORAL; RESPIRATORY (INHALATION)
Qty: 30 CAPSULE | Refills: 0 | Status: SHIPPED | OUTPATIENT
Start: 2017-01-01 | End: 2017-01-01

## 2017-01-01 RX ORDER — DIGOXIN 0.25 MG/ML
125 INJECTION INTRAMUSCULAR; INTRAVENOUS ONCE
Status: COMPLETED | OUTPATIENT
Start: 2017-01-01 | End: 2017-01-01

## 2017-01-01 RX ORDER — GUAIFENESIN 400 MG/1
400 TABLET ORAL EVERY 4 HOURS PRN
Qty: 120 TABLET | Refills: 2 | Status: SHIPPED | OUTPATIENT
Start: 2017-01-01

## 2017-01-01 RX ORDER — ATORVASTATIN CALCIUM 20 MG/1
TABLET, FILM COATED ORAL
Qty: 90 TABLET | Refills: 0 | OUTPATIENT
Start: 2017-01-01

## 2017-01-01 RX ORDER — HEPARIN SODIUM 1000 [USP'U]/ML
60 INJECTION, SOLUTION INTRAVENOUS; SUBCUTANEOUS ONCE
Status: COMPLETED | OUTPATIENT
Start: 2017-01-01 | End: 2017-01-01

## 2017-01-01 RX ORDER — DILTIAZEM HYDROCHLORIDE 5 MG/ML
10 INJECTION INTRAVENOUS ONCE
Status: COMPLETED | OUTPATIENT
Start: 2017-01-01 | End: 2017-01-01

## 2017-01-01 RX ORDER — PROCHLORPERAZINE MALEATE 10 MG
10 TABLET ORAL EVERY 6 HOURS PRN
Status: DISCONTINUED | OUTPATIENT
Start: 2017-01-01 | End: 2017-01-01

## 2017-01-01 RX ORDER — SODIUM CHLORIDE 0.9 % (FLUSH) 0.9 %
3 SYRINGE (ML) INJECTION AS NEEDED
Status: DISCONTINUED | OUTPATIENT
Start: 2017-01-01 | End: 2017-01-01

## 2017-01-01 RX ORDER — NITROGLYCERIN 0.4 MG/1
0.4 TABLET SUBLINGUAL EVERY 5 MIN PRN
Status: DISCONTINUED | OUTPATIENT
Start: 2017-01-01 | End: 2017-01-01

## 2017-01-01 RX ORDER — DEXAMETHASONE SODIUM PHOSPHATE 4 MG/ML
VIAL (ML) INJECTION AS NEEDED
Status: DISCONTINUED | OUTPATIENT
Start: 2017-01-01 | End: 2017-01-01 | Stop reason: SURG

## 2017-01-01 RX ORDER — GUAIFENESIN 100 MG/5ML
400 SOLUTION ORAL EVERY 4 HOURS PRN
Status: DISCONTINUED | OUTPATIENT
Start: 2017-01-01 | End: 2017-01-01

## 2017-01-01 RX ORDER — DOCUSATE SODIUM 100 MG/1
100 CAPSULE, LIQUID FILLED ORAL 2 TIMES DAILY
Status: DISCONTINUED | OUTPATIENT
Start: 2017-01-01 | End: 2017-01-01

## 2017-01-01 RX ORDER — ATORVASTATIN CALCIUM 20 MG/1
20 TABLET, FILM COATED ORAL NIGHTLY
Qty: 90 TABLET | Refills: 2 | Status: CANCELLED | OUTPATIENT
Start: 2017-01-01

## 2017-01-01 RX ORDER — HYDROMORPHONE HYDROCHLORIDE 1 MG/ML
0.2 INJECTION, SOLUTION INTRAMUSCULAR; INTRAVENOUS; SUBCUTANEOUS EVERY 5 MIN PRN
Status: DISCONTINUED | OUTPATIENT
Start: 2017-01-01 | End: 2017-01-01 | Stop reason: HOSPADM

## 2017-01-01 RX ORDER — MORPHINE SULFATE 4 MG/ML
2 INJECTION, SOLUTION INTRAMUSCULAR; INTRAVENOUS EVERY 10 MIN PRN
Status: DISCONTINUED | OUTPATIENT
Start: 2017-01-01 | End: 2017-01-01 | Stop reason: HOSPADM

## 2017-01-01 RX ORDER — POLYETHYLENE GLYCOL 3350 17 G/17G
17 POWDER, FOR SOLUTION ORAL DAILY PRN
Status: DISCONTINUED | OUTPATIENT
Start: 2017-01-01 | End: 2017-01-01

## 2017-01-01 RX ORDER — 0.9 % SODIUM CHLORIDE 0.9 %
VIAL (ML) INJECTION
Status: DISCONTINUED
Start: 2017-01-01 | End: 2017-01-01

## 2017-01-01 RX ORDER — HYDROMORPHONE HYDROCHLORIDE 1 MG/ML
0.6 INJECTION, SOLUTION INTRAMUSCULAR; INTRAVENOUS; SUBCUTANEOUS EVERY 5 MIN PRN
Status: DISCONTINUED | OUTPATIENT
Start: 2017-01-01 | End: 2017-01-01 | Stop reason: HOSPADM

## 2017-01-01 RX ORDER — DEXTROSE MONOHYDRATE 25 G/50ML
50 INJECTION, SOLUTION INTRAVENOUS AS NEEDED
Status: DISCONTINUED | OUTPATIENT
Start: 2017-01-01 | End: 2017-01-01

## 2017-01-01 RX ORDER — SODIUM CHLORIDE, SODIUM LACTATE, POTASSIUM CHLORIDE, CALCIUM CHLORIDE 600; 310; 30; 20 MG/100ML; MG/100ML; MG/100ML; MG/100ML
INJECTION, SOLUTION INTRAVENOUS CONTINUOUS PRN
Status: DISCONTINUED | OUTPATIENT
Start: 2017-01-01 | End: 2017-01-01 | Stop reason: SURG

## 2017-01-01 RX ORDER — ACETAMINOPHEN 325 MG/1
TABLET ORAL EVERY 6 HOURS PRN
Status: CANCELLED | OUTPATIENT
Start: 2017-01-01

## 2017-01-01 RX ORDER — CLOPIDOGREL BISULFATE 75 MG/1
75 TABLET ORAL DAILY
COMMUNITY
End: 2017-01-01

## 2017-01-01 RX ORDER — INSULIN DETEMIR 100 [IU]/ML
INJECTION, SOLUTION SUBCUTANEOUS
Qty: 5 PEN | Refills: 6 | Status: ON HOLD | OUTPATIENT
Start: 2017-01-01 | End: 2017-01-01

## 2017-01-01 RX ORDER — DILTIAZEM HYDROCHLORIDE 5 MG/ML
INJECTION INTRAVENOUS
Status: DISPENSED
Start: 2017-01-01 | End: 2017-01-01

## 2017-01-01 RX ORDER — HYDROCODONE BITARTRATE AND ACETAMINOPHEN 5; 325 MG/1; MG/1
1 TABLET ORAL AS NEEDED
Status: DISCONTINUED | OUTPATIENT
Start: 2017-01-01 | End: 2017-01-01 | Stop reason: HOSPADM

## 2017-01-01 RX ORDER — ZOLPIDEM TARTRATE 5 MG/1
5 TABLET ORAL NIGHTLY PRN
Status: DISCONTINUED | OUTPATIENT
Start: 2017-01-01 | End: 2017-01-01

## 2017-01-01 RX ORDER — ATORVASTATIN CALCIUM 20 MG/1
20 TABLET, FILM COATED ORAL NIGHTLY
Status: DISCONTINUED | OUTPATIENT
Start: 2017-01-01 | End: 2017-01-01

## 2017-01-01 RX ORDER — PAROXETINE HYDROCHLORIDE 20 MG/1
20 TABLET, FILM COATED ORAL DAILY
Status: DISCONTINUED | OUTPATIENT
Start: 2017-01-01 | End: 2017-01-01

## 2017-01-01 RX ORDER — LOSARTAN POTASSIUM 50 MG/1
TABLET ORAL
Qty: 90 TABLET | Refills: 2 | Status: CANCELLED | OUTPATIENT
Start: 2017-01-01

## 2017-01-01 RX ORDER — BISACODYL 10 MG
10 SUPPOSITORY, RECTAL RECTAL
Status: DISCONTINUED | OUTPATIENT
Start: 2017-01-01 | End: 2017-01-01

## 2017-01-01 RX ORDER — RANITIDINE 25 MG/ML
50 INJECTION, SOLUTION INTRAMUSCULAR; INTRAVENOUS AS NEEDED
Status: CANCELLED | OUTPATIENT
Start: 2017-01-01

## 2017-01-01 RX ORDER — DIPHENHYDRAMINE HYDROCHLORIDE 50 MG/ML
INJECTION INTRAMUSCULAR; INTRAVENOUS EVERY 4 HOURS PRN
Status: CANCELLED | OUTPATIENT
Start: 2017-01-01

## 2017-01-01 RX ORDER — SODIUM PHOSPHATE, DIBASIC AND SODIUM PHOSPHATE, MONOBASIC 7; 19 G/133ML; G/133ML
1 ENEMA RECTAL ONCE AS NEEDED
Status: DISCONTINUED | OUTPATIENT
Start: 2017-01-01 | End: 2017-01-01

## 2017-01-01 RX ORDER — MORPHINE SULFATE 4 MG/ML
4 INJECTION, SOLUTION INTRAMUSCULAR; INTRAVENOUS EVERY 2 HOUR PRN
Status: DISCONTINUED | OUTPATIENT
Start: 2017-01-01 | End: 2017-01-01

## 2017-01-01 RX ORDER — METOPROLOL SUCCINATE 25 MG/1
TABLET, EXTENDED RELEASE ORAL
Qty: 90 TABLET | Refills: 2 | Status: SHIPPED | OUTPATIENT
Start: 2017-01-01

## 2017-01-01 RX ORDER — DILTIAZEM HYDROCHLORIDE 180 MG/1
180 CAPSULE, COATED, EXTENDED RELEASE ORAL DAILY
Status: DISCONTINUED | OUTPATIENT
Start: 2017-01-01 | End: 2017-01-01

## 2017-01-01 RX ORDER — AMIODARONE HYDROCHLORIDE 200 MG/1
400 TABLET ORAL
Status: DISCONTINUED | OUTPATIENT
Start: 2017-01-01 | End: 2017-01-01

## 2017-01-01 RX ORDER — MORPHINE SULFATE 4 MG/ML
4 INJECTION, SOLUTION INTRAMUSCULAR; INTRAVENOUS EVERY 10 MIN PRN
Status: DISCONTINUED | OUTPATIENT
Start: 2017-01-01 | End: 2017-01-01 | Stop reason: HOSPADM

## 2017-01-01 RX ORDER — ACETAMINOPHEN 325 MG/1
650 TABLET ORAL EVERY 6 HOURS PRN
Status: DISCONTINUED | OUTPATIENT
Start: 2017-01-01 | End: 2017-01-01

## 2017-01-01 RX ORDER — ALBUTEROL SULFATE 90 UG/1
2 AEROSOL, METERED RESPIRATORY (INHALATION) AS NEEDED
Status: CANCELLED | OUTPATIENT
Start: 2017-01-01

## 2017-01-01 RX ORDER — ASPIRIN 81 MG/1
81 TABLET, CHEWABLE ORAL DAILY
Qty: 30 TABLET | Refills: 0 | Status: SHIPPED | OUTPATIENT
Start: 2017-01-01

## 2017-01-01 RX ORDER — ONDANSETRON 2 MG/ML
INJECTION INTRAMUSCULAR; INTRAVENOUS AS NEEDED
Status: DISCONTINUED | OUTPATIENT
Start: 2017-01-01 | End: 2017-01-01 | Stop reason: SURG

## 2017-01-01 RX ORDER — HYDROCODONE BITARTRATE AND ACETAMINOPHEN 5; 325 MG/1; MG/1
2 TABLET ORAL AS NEEDED
Status: DISCONTINUED | OUTPATIENT
Start: 2017-01-01 | End: 2017-01-01 | Stop reason: HOSPADM

## 2017-01-01 RX ORDER — MEPERIDINE HYDROCHLORIDE 25 MG/ML
INJECTION INTRAMUSCULAR; INTRAVENOUS; SUBCUTANEOUS AS NEEDED
Status: CANCELLED | OUTPATIENT
Start: 2017-01-01

## 2017-01-01 RX ORDER — FLUTICASONE PROPIONATE AND SALMETEROL 500; 50 UG/1; UG/1
1 POWDER RESPIRATORY (INHALATION) 2 TIMES DAILY
COMMUNITY
End: 2017-01-01

## 2017-01-01 RX ORDER — SODIUM CHLORIDE, SODIUM LACTATE, POTASSIUM CHLORIDE, CALCIUM CHLORIDE 600; 310; 30; 20 MG/100ML; MG/100ML; MG/100ML; MG/100ML
INJECTION, SOLUTION INTRAVENOUS CONTINUOUS
Status: DISCONTINUED | OUTPATIENT
Start: 2017-01-01 | End: 2017-01-01

## 2017-01-01 RX ORDER — PROCHLORPERAZINE MALEATE 10 MG
10 TABLET ORAL EVERY 6 HOURS PRN
Qty: 60 TABLET | Refills: 1 | Status: SHIPPED | OUTPATIENT
Start: 2017-01-01

## 2017-01-01 RX ORDER — SUCCINYLCHOLINE CHLORIDE 20 MG/ML
INJECTION INTRAMUSCULAR; INTRAVENOUS AS NEEDED
Status: DISCONTINUED | OUTPATIENT
Start: 2017-01-01 | End: 2017-01-01 | Stop reason: SURG

## 2017-01-01 RX ORDER — METOPROLOL SUCCINATE 25 MG/1
25 TABLET, EXTENDED RELEASE ORAL
Status: DISCONTINUED | OUTPATIENT
Start: 2017-01-01 | End: 2017-01-01

## 2017-01-01 RX ORDER — HALOPERIDOL 5 MG/ML
0.25 INJECTION INTRAMUSCULAR ONCE AS NEEDED
Status: DISCONTINUED | OUTPATIENT
Start: 2017-01-01 | End: 2017-01-01 | Stop reason: HOSPADM

## 2017-01-01 RX ORDER — LOSARTAN POTASSIUM 50 MG/1
TABLET ORAL
Qty: 90 TABLET | Refills: 1 | Status: SHIPPED | OUTPATIENT
Start: 2017-01-01 | End: 2017-01-01

## 2017-01-01 RX ORDER — PANTOPRAZOLE SODIUM 40 MG/1
40 TABLET, DELAYED RELEASE ORAL
COMMUNITY

## 2017-01-01 RX ADMIN — PHENYLEPHRINE HCL 100 MCG: 10 MG/ML VIAL (ML) INJECTION at 09:31:00

## 2017-01-01 RX ADMIN — PHENYLEPHRINE HCL 100 MCG: 10 MG/ML VIAL (ML) INJECTION at 09:07:00

## 2017-01-01 RX ADMIN — SODIUM CHLORIDE, SODIUM LACTATE, POTASSIUM CHLORIDE, CALCIUM CHLORIDE: 600; 310; 30; 20 INJECTION, SOLUTION INTRAVENOUS at 09:44:00

## 2017-01-01 RX ADMIN — PHENYLEPHRINE HCL 100 MCG: 10 MG/ML VIAL (ML) INJECTION at 09:25:00

## 2017-01-01 RX ADMIN — LIDOCAINE HYDROCHLORIDE 50 MG: 10 INJECTION, SOLUTION EPIDURAL; INFILTRATION; INTRACAUDAL; PERINEURAL at 09:05:00

## 2017-01-01 RX ADMIN — ROCURONIUM BROMIDE 5 MG: 10 INJECTION, SOLUTION INTRAVENOUS at 09:05:00

## 2017-01-01 RX ADMIN — SUCCINYLCHOLINE CHLORIDE 80 MG: 20 INJECTION INTRAMUSCULAR; INTRAVENOUS at 09:06:00

## 2017-01-01 RX ADMIN — PHENYLEPHRINE HCL 100 MCG: 10 MG/ML VIAL (ML) INJECTION at 09:22:00

## 2017-01-01 RX ADMIN — PHENYLEPHRINE HCL 100 MCG: 10 MG/ML VIAL (ML) INJECTION at 09:09:00

## 2017-01-01 RX ADMIN — SODIUM CHLORIDE, SODIUM LACTATE, POTASSIUM CHLORIDE, CALCIUM CHLORIDE: 600; 310; 30; 20 INJECTION, SOLUTION INTRAVENOUS at 09:01:00

## 2017-01-01 RX ADMIN — ONDANSETRON 4 MG: 2 INJECTION INTRAMUSCULAR; INTRAVENOUS at 09:31:00

## 2017-01-01 RX ADMIN — DEXAMETHASONE SODIUM PHOSPHATE 4 MG: 4 MG/ML VIAL (ML) INJECTION at 09:11:00

## 2017-03-20 NOTE — TELEPHONE ENCOUNTER
Please advise on refill request   No current labs.     Cholesterol Medications  Protocol Criteria:  · Appointment scheduled in the past 12 months or in the next 3 months  · ALT & LDL on file in the past 12 months  · ALT result < 80  · LDL result <130   Rece

## 2017-03-27 NOTE — TELEPHONE ENCOUNTER
Message was left on voice mail that his Rx for Atorvastatin was not filled as requested, but that he will need to get labs done that is in the system and that he will need to be fasting 12 hours prior to getting it done and that he will need to schedule an

## 2017-05-03 PROBLEM — Z96.1 PSEUDOPHAKIA OF RIGHT EYE: Status: ACTIVE | Noted: 2017-01-01

## 2017-05-03 PROBLEM — H26.9 CATARACT: Status: ACTIVE | Noted: 2017-01-01

## 2017-05-03 NOTE — PROGRESS NOTES
HPI:   Marcos Segundo is a 80year old male who presents for a Medicare Subsequent Annual Wellness visit (Pt already had Initial Annual Wellness).     Patient patient presents today for physical exam, states doing well otherwise, denies chest pain, shortness Prescriptions Marked as Taking for the 5/3/17 encounter (Office Visit) with Velma Hawkins MD:  Metoprolol Succinate ER 25 MG Oral Tablet 24 Hr TAKE 1 TABLET (25MG) BY ORAL ROUTE EVERY DAY   Tiotropium Bromide Monohydrate (SPIRIVA HANDIHALER) 18 MCG I Chalazion of left upper eyelid (2010/2009); Diabetes type 1, controlled ([facility] [address] (10/9/2014); Age-related nuclear cataract of both eyes (10/9/2014); Corneal scar, right eye (10/9/2014); Closed fracture of pelvic rim ([facility] [address] (12/25/15);  Vision decreased (1960- r index is 24.43 kg/(m^2) as calculated from the following:    Height as of this encounter: 5' 7\" (1.702 m). Weight as of this encounter: 156 lb (70.761 kg).     Medicare Hearing Assessment  (Required for AWV/SWV)    Hearing Screening    Time taken:  5/3/ Normocephalic, without obvious abnormality, atraumatic   Eyes:  PERRL, conjunctiva/corneas clear, EOM's intact, both eyes   Ears:  Normal TM's and external ear canals, both ears   Nose: Nares normal, septum midline, mucosa normal, no drainage or sinus tend balanced diet   Maintain a regular cardiovascular exercise /walking as tolerated   Complete labs as ordered , preventative health maintenance discussed colonoscopy  -   3 years  Ago   psa -  Ordered ,pt refusing  [facility] and urology   referal state   He feels Using  paroxetine any more -state  He does not needed it    Mr. Efrain Palmer does not currently take aspirin. Patient agrees to start aspirin, see order      Diet assessment: fair     Advanced Directive:  Living Will on [facility]?   Jana Vidales does not hav or bathing?: No    Problems with daily activities? : No    Memory Problems?: No      Fall/Risk Assessment     Do you have 3 or more medical conditions?: 1-Yes    Have you fallen in the last 12 months?: 0-No    Do you accidently lose urine?: 0-No    Do you Screening     LDL Annually LDL CHOLESTEROL (mg/dL)   Date Value   05/04/2017 154*   12/02/2015 109*        EKG - w/ Initial Preventative Physical Exam only, or if medically necessary Electrocardiogram date    Colorectal Cancer Screening      Colonoscopy Sc data found. Creat/alb ratio  Annually      LDL  Annually LDL CHOLESTEROL (mg/dL)   Date Value   05/04/2017 154*   12/02/2015 109*    No flowsheet data found.      Dilated Eye exam  Annually Data entered on: 11/3/2016   Last Dilated Eye Exam 11/3/2016

## 2017-05-08 NOTE — PROGRESS NOTES
Quick Note:    Please call patient with test results.     Labs are     CBC - WBC Mildly Elevated -and mild Anemia Present Platelets Elevated - mildly    patient Need Additional Testing For anemia -     CMP-sugar Elevated , electrolytes, kidney function Mild

## 2017-05-16 NOTE — TELEPHONE ENCOUNTER
----- Message from Hannah Retana MD sent at 5/16/2017  8:05 AM CDT -----  Please call pt  Labs are     CBC - [facility]  Mildly  Elevated  -and mild  Anemia  Present   Platelets  Elevated - mildly      patient  Need  Additional  Testing  For anemia -     CM

## 2017-05-20 NOTE — TELEPHONE ENCOUNTER
Pt contacted. Name and  verified. Pt informed of test results and recommendations. Pt verbalized understanding.

## 2017-06-01 NOTE — TELEPHONE ENCOUNTER
Notes Recorded by Clifford Yeager MD on 5/28/2017 at 8:50 AM  CALLED LEFT MESSAGE  -REGARDING LABS -   ELEVATED WBC COUNT IF  ANY  INFECTION  SY -FEVER , CHILLS COUGH , UTI SY -GO TO IC/ER - INCREASE  WATER INTAKE - POSSIBLE DEHYDRATION.   LOW B12 AND V

## 2017-06-01 NOTE — TELEPHONE ENCOUNTER
----- Message from Kassie Garcia MD sent at 5/31/2017  3:01 PM CDT -----  Called  Again left   Detailed  ,mesage  -to  Check  sugar  Regularly  And  To ue  Le insulin  - according  To  sugar -  Not sure if patient   Taking   Insulin   Regularly   And

## 2017-06-10 NOTE — TELEPHONE ENCOUNTER
Refill Protocol Appointment Criteria: Refilled per protocol    · Appointment scheduled in the past 6 months or in the next 3 months  Recent Visits       Provider Department Primary Dx    2 weeks ago Mireya Brewer MD [facility], [facility], [address] [place] [address]

## 2017-06-23 NOTE — H&P
Referring Physician  Kassie Garcia MD    Chief Complaint  Dyspnea    History of Present Illness  Patient is a 36-year-old [facility] male who presents the pulmonary clinic for initial visit.   He admits to underlying history of COPD which was diagnosed Denies  Exposures: Admits to carbon tetrachloride exposure    Medications    Current Outpatient Prescriptions on File Prior to Visit:  Luis Green 18 MCG Inhalation Cap INHALE 1 CAPSULE (18 MCG TOTAL) INTO THE LUNGS DAILY.  Disp: 30 capsule Rfl: 0 facility-administered medications on file prior to visit.     Allergies  No Known Allergies    Physical Exam  Constitutional: no acute distress, alert, oriented  HEENT: PERRL, EOMI, nares patents, no nasal polyps   Cardio: RRR, S1 S2  Respiratory: clear to

## 2017-07-02 NOTE — PROCEDURES
[facility]    Patient's Name Enmanuel Huynh MRN [id number]    10/8/1945 Pulmonologist Ximena Erickson MD   Location [facility] PCP Germain Dunne MD     IMPRESSION:    The PFTs are Abnormal.    There is sever

## 2017-07-03 NOTE — TELEPHONE ENCOUNTER
I was able to reach pt and he was inform of all your messages below. Pt stated that he is not concern about the WBC being high. He stated that back then he had inflammation and believes this is what caused the WBC to be high.  Pt stated that

## 2017-07-07 NOTE — TELEPHONE ENCOUNTER
Again Called  Patient no  Answer -   left  Message  Again  To schedule apt  -  Regarding labs  And f/u visit - in few  Weeks  -    With me or any  Other  available Doctors .

## 2017-07-25 NOTE — TELEPHONE ENCOUNTER
Language line called for a Eritrean interpretor. Pt stts that he would like another maintenance inhaler. Pt stts that he has a nebulizer at home but does not use it because it makes his symptoms worst. Please advise.

## 2017-07-25 NOTE — TELEPHONE ENCOUNTER
I have prescribe Advair for the patient. I would hold off on nebulizer for now if he doesn't respond well to it. I would tell him to follow up with me in clinic in 2 months.

## 2017-07-25 NOTE — TELEPHONE ENCOUNTER
You may let the patient know that I reviewed his PFT results which reveal evidence of severe COPD.   He is currently on Spiriva therapy but would be a candidate for additional inhaler therapy such as Breo, Advair etc. you may ask him if he wants to consider

## 2017-08-29 NOTE — TELEPHONE ENCOUNTER
Patient called and informed. Appointment made for 9/12/17 at 10:20 am in [place] with Dr. Salvador Gallardo.

## 2017-09-12 PROBLEM — J44.9 CHRONIC OBSTRUCTIVE PULMONARY DISEASE (HCC): Status: ACTIVE | Noted: 2017-01-01

## 2017-09-12 PROBLEM — E78.00 PURE HYPERCHOLESTEROLEMIA: Status: ACTIVE | Noted: 2017-01-01

## 2017-09-12 PROBLEM — D72.829 LEUKOCYTOSIS: Status: ACTIVE | Noted: 2017-01-01

## 2017-09-12 NOTE — PROGRESS NOTES
HPI:    Patient ID: Jorge Boles is a 80year old male.     Diabetes   He presents for his follow-up (pt state doing  fine  except  Dyspea  with walking  , denies  sob at rest ,  pt state  cough is as usual and   still has  decreased appetitie   - pt  stat chills, fatigue and fever. HENT: Negative for ear pain and sore throat. Eyes: Negative for blurred vision, pain and redness.    Respiratory: Positive for cough (per pt  coug is as  usual  , bringing some phlegm -   white occasionaly yellow  phlegm  has EVENING MEALS Disp: 360 tablet Rfl: 1   Metoprolol Succinate ER 25 MG Oral Tablet 24 Hr TAKE 1 TABLET (25MG) BY ORAL ROUTE EVERY DAY Disp: 90 tablet Rfl: 2   PAROXETINE HCL ER 25 MG Oral Tablet 24 Hr TAKE ONE TABLET TWICE DAILY (Patient taking differently: Lymphadenopathy:     He has no cervical adenopathy. Neurological: He is alert and oriented to person, place, and time. Skin: Skin is warm. There is pallor.    Psychiatric: His speech is normal and behavior is normal. Judgment and thought content christie sometimes  Forgets to take it     (J44.9) Chronic obstructive pulmonary disease, unspecified COPD type ([facility]  -severe   - PFT    XR CHEST PA + LAT CHEST today    pt seeing pulmonary -DR Han in few  Days              (D72.829) Leukocytosis, unspecified t total) under the tongue every 5 (five) minutes as needed for Chest pain. No  More than   3  Times - If not  Effective  To  Go to    ER      atorvastatin 20 MG Oral Tab 30 tablet 2      Sig: Take 1 tablet (20 mg total) by mouth nightly.            Imaging &

## 2017-09-12 NOTE — PROGRESS NOTES
Pt was given the influenza vaccine in the left deltoid and the prevnair 13 vaccine in the right deltoid. Pt tolerated injection well.

## 2017-09-15 NOTE — PROGRESS NOTES
Referring Physician  Taylor Casillas MD    History of Present Illness  Patient is a 73-year-old male who presents the pulmonary clinic for follow-up visit. He admits to some improvement in his dyspnea after starting Advair therapy.   He has not JACI. MARY Almaguer TAKE 1 TABLET (25MG) BY ORAL ROUTE EVERY DAY Disp: 90 tablet Rfl: 2   PAROXETINE HCL ER 25 MG Oral Tablet 24 Hr TAKE ONE TABLET TWICE DAILY (Patient taking differently: TAKE ONE TABLET ONCE A DAY) Disp: 180 tablet Rfl: 1   BusPIRone HCl (BUSPAR) 5 MG Oral on Plavix therapy. I will confirm with cardiology and likely advised him to stop Plavix given his current presentation. Chest x-ray revealed evidence of right upper lobe reticular infiltrate seen.   Given his presentation I am concerned for possible pulmo

## 2017-09-15 NOTE — TELEPHONE ENCOUNTER
Informed Dr. Ivan Rick of below. He verbalized understanding. Per Dr. Ivan Rick he is leaning towards having the pt go to the ER instead of directly admitting him, but he will d/w pt & nothing further is needed from RN.

## 2017-09-15 NOTE — TELEPHONE ENCOUNTER
Per Dr. Ivan Rick pt will be returning to [facility] and Dr. Ivan Rick will be calling to speak with him. Pt returned to clinic and put into room. Dr. Ivan Rick called and spoke to pt. Pt states Dr. Ivan Rick wants to directly admit him.   Pt states he told

## 2017-09-20 NOTE — ANESTHESIA PREPROCEDURE EVALUATION
Anesthesia PreOp Note    HPI:     Luann Blake is a 80year old male who presents for preoperative consultation requested by: Ingrid Thorpe DO    Date of Surgery: 9/20/2017    Procedure(s):  BRONCHOSCOPY  ENDOBRONCHIAL ULTRASOUND (EBUS)  Indication: Diabetes type 1, controlled ([facility] 10/9/2014   • High cholesterol    • IDDM (insulin dependent diabetes mellitus) ([facility] 2013 2010 diabetes no retinopathy   • Other and unspecified hyperlipidemia    • Pancreatitis 2006   • Type II or unspecified type diab at night.) Disp: 5 pen Rfl: 6 9/14/2017   Clopidogrel Bisulfate 75 MG Oral Tab TAKE 1 TABLET (75 MG TOTAL) BY MOUTH ONCE DAILY.  Disp: 90 tablet Rfl: 3 9/14/2017   Losartan Potassium 50 MG Oral Tab TAKE 1 TABLET BY MOUTH EVERY MORNING Disp: 90 tablet Rfl: 1 6/1/2016    Smokeless tobacco: Never Used    Comment: 1 unit per day    Alcohol use No    Drug use: No    Sexual activity: Not on file     Other Topics Concern    Caffeine Concern Yes    Comment: coffee, 2 cups daily     Social History Narrative   None on complications, and any alternative forms of anesthetic management. All of the patient's questions were answered to the best of my ability. The patient desires the anesthetic management as planned.   YANETH MONTOYA  9/20/2017 8:44 AM  I agree with david

## 2017-09-20 NOTE — PROCEDURES
Bronchoscopy with BAL of right upper lobe and endobronchial ultrasound and transbronchial needle aspiration of station 7 lymph node    Patient sedated and intubated prior to procedure.   Video bronchoscope advanced through ET tube and lower trachea identifi

## 2017-09-20 NOTE — ANESTHESIA POSTPROCEDURE EVALUATION
Patient: Osborn Dancer    Procedure Summary     Date:  09/20/17 Room / Location:  [facility] ENDOSCOPY 05 / [facility] ENDOSCOPY    Anesthesia Start:  0901 Anesthesia Stop:  5097    Procedures:       BRONCHOSCOPY (N/A )      ENDOBRONCHIAL ULTRASOUND (EBUS) (N/A ) Diagno

## 2017-09-21 NOTE — TELEPHONE ENCOUNTER
Discussed pathology results with the patient. Explained to him that it appears that he has evidence of squamous cell carcinoma. Will arrange care with to see oncologist and further testing. Patient agreeable.

## 2017-09-21 NOTE — TELEPHONE ENCOUNTER
Pt states that he is returning the doctor's call from yesterday. No message noted. Per pt he is not having any symptoms.

## 2017-09-21 NOTE — TELEPHONE ENCOUNTER
Discussed with pt  Labs results and diagnosis of squamous  cell cancer of the bronchoscopy-    Pt will see  Oncologist  Tomorrow    Discussed  Again -Anemia  Elevated  WBC anemia  -  Decreased  Kidney  Function - elevated  Sugars  .   Patient states she is

## 2017-09-22 NOTE — PROGRESS NOTES
Gaston scheduled for MRI brain on Saturday Sept 23 at 10:30 am, at [address] [place] [address].      Pet Scan scheduled for Monday Sept 25 at 6:15am.  All prep instructions, parking and test location reviewed with Elpidio Mackay and his wife Tiffany Cartagena who verbalized understandi

## 2017-09-22 NOTE — PROGRESS NOTES
Call placed to [facility] to introduce myself as Nurse Navigator and explained my role in his care. Role of NN explained as providing:  Guidance to healthcare services to ensure continuity and coordination of care.   Education about your diagnosis, treatmen

## 2017-09-22 NOTE — CONSULTS
[facility]    PATIENT'S NAME: Michael Allyssa   CONSULTING PHYSICIAN: Azalia Ruiz.  George Wisdom MD   PATIENT ACCOUNT #: [de-identified] LOCATION: [address] [place] [address] RECORD #: [id number] YOB: 1945   CONSULTATION DATE: 09/22/2017       CANCER [facility] reviewed and are negative x12. PHYSICAL EXAMINATION:    GENERAL:  No acute distress. Alert and oriented. VITAL SIGNS:  ECOG performance status is 1. Weight 64.4 kg, temperature 36.6, blood pressure 93/51, pulse is 96, respiratory rate 16.   HEENT:  M Adela Erp, DO Ian Michelle MD

## 2017-09-26 NOTE — PROGRESS NOTES
Call placed to [facility] to schedule follow up with Dr Ayse Montoya on Thursday to review results of PET and MRI. [facility] stated he was not having a good day today due to feeling short of breath and 'hysterical' due to active imagination and new diagnosis.   Stated he w

## 2017-09-28 PROBLEM — C34.91 MALIGNANT NEOPLASM OF RIGHT LUNG (HCC): Status: ACTIVE | Noted: 2017-01-01

## 2017-09-28 NOTE — PROGRESS NOTES
Cancer Center Progress Note    Patient Name: Missy Cali   YOB: 1945   Medical Record Number: [id number]   Attending Physician: Carlitos Castro M.D.      Chief Complaint:  Metastatic squamous cell ca of right lung    History of Present Illness: decreased 1960- right eye infection     ~ 1960 at age 16; decreased vision right eye 2nd to corneal scar from herpes zoster infection at age 16       Past Surgical History:  Past Surgical History:  1995, 1993: ANGIOPLASTY (CORONARY)  No date: ANGIOPLASTY ( minutes as needed for Chest pain. No  More than   3  Times - If not  Effective  To  Go to    ER, Disp: 30 tablet, Rfl: 0  •  Tiotropium Bromide Monohydrate 18 MCG Inhalation Cap, Inhale 1 capsule (18 mcg total) into the lungs daily. , Disp: 30 capsule, Rfl: clear.   Neck: No JVD. No palpable lymphadenopathy. Neck is supple. Lymphatics:  There is no palpable peripheral lymphadenopathy   Chest: Symmetric expansion, nonlabored breathing  Cardiovascular: Regular with palpable distal pulses   Abdomen: Soft, non te right lung ([address]    Staging form: Lung AJCC V7    - Clinical: Stage IV (TX, NX, M1b) - Signed by Ciera Weston MD on 9/28/2017    Impression and Plan:  67-year-old male former smoker with COPD being seen by Medical Oncology for metastatic squamous cell ca

## 2017-09-28 NOTE — PROGRESS NOTES
Patient seen today in clinic, requested that MD sign physician written certification form for [place] medical [place]  program. Form signed and mailed to [facility].

## 2017-10-03 NOTE — PROGRESS NOTES
10/3/2017  [place]  :  10/8/1945  [address]      Met with Yanet Button and spouse and presented the patient with the approximate cost of chemotherapy drugs ordered by the oncologist.  The patient was provided with a copy of the est

## 2017-10-03 NOTE — PROGRESS NOTES
Medication Education Record: IV Therapy    Learner:  Patient and Spouse    Barriers / Limitations:  None    Diagnosis:   Lung cancer    IV Cancer Treatment Name(s): pembrolizumab  IV Cancer Treatment Frequency every 3 weeks    Number of cycles planned base by your provider):  Prochloperazine (Compazine) 10 mg every 6 hours      Helpful hints during cancer treatment:    Diet:  o Avoid greasy or spicy foods on days surrounding chemotherapy  o Eat small frequent meals per day (6-7 meals) rather than 3 large sue instructions. Skin Care  o Avoid direct sunlight.  o Wear a broad-spectrum sunscreen with an SPF of 30 or higher on any skin exposed to the sun. Re-apply every 2 hours if in the sun and after bathing or sweating.   o For dry skin, use an alcohol-free loti patient checks BS BID  - not always done. Has insulin sliding scale from Dr. Alex Muñiz. BS may be in the 300s at night.     Safety and Handling of Chemotherapy  While you or your family member is receiving chemotherapy, whether in the clinic or at home, should be used throughout treatment to prevent pregnancy while on these medications and for several months or years after therapy. Chemotherapy can have harmful side effects to the fetus, especially in the first trimester.   In addition, menstrual cycles c

## 2017-10-03 NOTE — PATIENT INSTRUCTIONS
Medication Education Record: IV Therapy    Learner:  Patient and Spouse    Barriers / Limitations:  None    Diagnosis:   Lung cancer    IV Cancer Treatment Name(s): pembrolizumab  IV Cancer Treatment Frequency every 3 weeks    Number of cycles planned base by your provider):  Prochloperazine (Compazine) 10 mg every 6 hours      Helpful hints during cancer treatment:    Diet:  o Avoid greasy or spicy foods on days surrounding chemotherapy  o Eat small frequent meals per day (6-7 meals) rather than 3 large sue instructions. Skin Care  o Avoid direct sunlight.  o Wear a broad-spectrum sunscreen with an SPF of 30 or higher on any skin exposed to the sun. Re-apply every 2 hours if in the sun and after bathing or sweating.   o For dry skin, use an alcohol-free loti chemotherapy, whether in the clinic or at home, the following precautions must be taken to lessen any exposure to the medication. Handling Body Waste:   1. Caregivers must wear gloves if exposed to the patient’s blood, urine, stool or vomit.   Dispose o first trimester. In addition, menstrual cycles can become irregular during and after treatment, so you may not know if you are at a time in your cycle when you could become pregnant or if you are actually pregnant.

## 2017-10-08 PROBLEM — I21.4 NSTEMI (NON-ST ELEVATED MYOCARDIAL INFARCTION) (HCC): Status: ACTIVE | Noted: 2017-01-01

## 2017-10-08 PROBLEM — R79.89 ELEVATED TROPONIN: Status: ACTIVE | Noted: 2017-01-01

## 2017-10-08 PROBLEM — R06.00 DYSPNEA ON EXERTION: Status: ACTIVE | Noted: 2017-01-01

## 2017-10-08 PROBLEM — R06.09 DYSPNEA ON EXERTION: Status: ACTIVE | Noted: 2017-01-01

## 2017-10-08 PROBLEM — E83.42 HYPOMAGNESEMIA: Status: ACTIVE | Noted: 2017-01-01

## 2017-10-08 PROBLEM — R77.8 ELEVATED TROPONIN: Status: ACTIVE | Noted: 2017-01-01

## 2017-10-08 PROBLEM — I48.92 ATRIAL FLUTTER, UNSPECIFIED TYPE (HCC): Status: ACTIVE | Noted: 2017-01-01

## 2017-10-08 NOTE — ED PROVIDER NOTES
Patient Seen in: [facility] AND [facility] Emergency Department    History   Patient presents with:  Dyspnea HAO SOB (respiratory)    Stated Complaint:     HPI    80-year-old male with history of CAD, COPD, recently diagnosed lung cancer presents for evaluation @ [address] 2006, 2001: OTHER SURGICAL HISTORY      Comment: stent placed  2009: OTHER SURGICAL HISTORY      Comment: excision - chalazion  1995: REMOVAL GALLBLADDER    Family History   Problem Relation Age of Onset   • Diabetes Mother    • Eye Problems Mother place, and time. Skin: Skin is warm. There is pallor. Psychiatric: He has a normal mood and affect.        Pulse Ox: 98%, Normal, RA    Cardiac Monitor: Pulse Readings from Last 1 Encounters:  10/08/17 : 173  , atrial fibrillation, tachy     Radiology f Narrative: The following orders were created for panel order CBC WITH DIFFERENTIAL WITH PLATELET.   Procedure                               Abnormality         Status                     ---------                               -----------         --- be hypomagnesemic, replacement ordered. Discussed with and admitted to Dr. Yvonne Ha. Total critical care time spent exceeds 90 minutes including time at the bedside, reviewing results, speaking with consultants.     Disposition and Plan     Clinical Im

## 2017-10-08 NOTE — ED INITIAL ASSESSMENT (HPI)
Pt c/o SOB. Appears pale and respirations are somewhat labored. Pulse irregular with HR between 98 and 178 bts/min.

## 2017-10-08 NOTE — ED NOTES
Pt presents from home with wife for c/o SOB. Pt denies CP. Pt found to be in afib, rate 180s upon applying cardiac monitor when pt arrived in room. Pt states recent diagnosis of Lung CA, states scheduled to start immunotherapy on Tuesday.  Pt able to speak

## 2017-10-08 NOTE — ED NOTES
Dr. Sofya Barrett at bedside for re-evaluation. VO obtained for additional 10mg Cardizem bolus. Bolus administered.

## 2017-10-08 NOTE — CONSULTS
Reason for Consultation: Atrial flutter, NSTEMI    Assessment/Plan:     1. Atrial flutter  - difficult rate control  - will start IV amio  2. NSTEMI  - known severe multivessel disease  3.  Lung cancer with liver mets  - recent doagnosis        PLAN:  - ICU herpes zoster infection at age 16       Family History   Problem Relation Age of Onset   • Diabetes Mother    • Eye Problems Mother      cataracts   • Lipids Mother      hyperlipidemia   • Diabetes Father    • Glaucoma Father    • Eye Problems Father 10/08/17 1306 115/79 172 30 99 % - -   10/08/17 1252 115/72 155 26 97 % - -   10/08/17 1230 104/65 150 25 98 % - -   10/08/17 1222 96/69 186 26 99 % - -   10/08/17 1211 103/88 160 24 99 % - -   10/08/17 1200 118/76 188 26 100 % - -   10/08/17 1147 113/55 part, using a computerized recognition system and may contain unintended errors.       Critical Care Time: I spent a total of 45 minutes with this critical patient

## 2017-10-09 NOTE — PROGRESS NOTES
[facility]    Progress Note    Darius Major Patient Status:  Inpatient    10/8/1945 MRN [id number]   Location [facility] 2W/SW Attending Lorna Mederos MD   Hosp Day # 1 PCP Kassie Garcia MD       Subjective:     Not f smoking  Not on home oxygen, but might need it on discharge  -Continue Spiriva. No clear exacerbation. -pt follows with Dr. Miya March as outpatient. Recently diagnosed squamous cell carcinoma of right lung under the care of Dr. Ronel Silva.    He is supposed t mediastinal lymphadenopathy, also increased since 9/15/17. Reticulation and atelectasis with possible right posterior lower lobe subpleural mass, new since 9/15/17.   Mosaic attenuation of the lungs bilaterally suggestive of air trapping which can be seen

## 2017-10-09 NOTE — PROGRESS NOTES
[facility] Cardiology  Progress Note    Zoe Keller Patient Status:  Inpatient    10/8/1945 MRN [id number]   Location [facility] 2W/SW Attending Meryle Guarneri, MD   Hosp Day # 1 PCP Anita Valladares MD     Impression:  1.  Meghana Soto infusion CONTINUOUS 200-3,000 Units/hr Intravenous Continuous   Normal Saline Flush 0.9 % injection 3 mL 3 mL Intravenous PRN   dextrose 50% injection 50 mL 50 mL Intravenous PRN   Glucose-Vitamin C (DEX-4) 4-0.006 g chewable tab 4 tablet 4 tablet Oral Q15 (INCRUSE ELLIPTA) 62.5 MCG/INH inhaler 1 puff 1 puff Inhalation Daily   Ipratropium Bromide (ATROVENT) 0.02 % nebulizer solution 0.5 mg 0.5 mg Nebulization Q4H PRN   amiodarone HCl (PACERONE) tab 400 mg 400 mg Oral 3x Daily Cardiac       Laboratory Data: vessel or small airways disease. Age-indeterminate mild compression deformity of the T11 vertebral body is unchanged. Subcentimeter low attenuation lesions within the liver and kidney are too small to characterize and unchanged.              Gambia

## 2017-10-09 NOTE — H&P
[facility]    PATIENT'S NAME: Morena Jones   ATTENDING PHYSICIAN: Florian Bray MD   PATIENT ACCOUNT#:   [id number]    LOCATION:  [address] [place] [address] RECORD #:   [id number]       YOB: 1945  ADMISSION DATE:       10/08/2 HISTORY:  Both parents were diabetic. SOCIAL HISTORY:  He lives with his wife. Former smoker. REVIEW OF SYSTEMS:  In addition to the above, patient has no fevers, chills, headaches, dizziness. No changes in vision or hearing.   As mentioned, no rece adenosine, digoxin, Cardizem in the emergency room, chronically on metoprolol at home. Heart rate seems slowing down now in high 90s. Blood pressure low normal.  Patient started on heparin drip in the emergency room and will continue.   Patient seen by ca

## 2017-10-09 NOTE — PLAN OF CARE
Diabetes/Glucose Control    • Glucose maintained within prescribed range  BLD  SUGARS ACHS Progressing

## 2017-10-10 NOTE — DIETARY NOTE
ADULT NUTRITION INITIAL ASSESSMENT    Pt is at high nutrition risk. Pt meets malnutrition criteria.       CRITERIA FOR MALNUTRITION DIAGNOSIS:  Criteria for severe malnutrition diagnosis: chronic illness related to wt loss greater than 5% in 1 month and re 1147 64.9 kg (143 lb)       GASTROINTESTINAL PROBLEMS: early satiety    FOOD/NUTRITION RELATED HISTORY:  Appetite: Fair  Intake:25-75 %    Intake Meeting Needs: No    Food Allergies: No    MEDICATIONS: Levemir, Novalog; Mag Ox, Toprol; others noted    LABS

## 2017-10-10 NOTE — PROGRESS NOTES
[facility] Cardiology  Progress Note    Jorge Door Patient Status:  Inpatient    10/8/1945 MRN [id number]   Location [facility] 2W/SW Attending Treasure Waldrop MD   Hosp Day # 2 PCP Perez Sylvester MD     Impression:  1.  Kwadwo Sensor Normal Saline Flush 0.9 % injection 3 mL 3 mL Intravenous PRN   dextrose 50% injection 50 mL 50 mL Intravenous PRN   Glucose-Vitamin C (DEX-4) 4-0.006 g chewable tab 4 tablet 4 tablet Oral Q15 Min PRN   Atropine Sulfate 0.1 MG/ML injection 0.5 mg 0.5 mg Daily Cardiac       Laboratory Data:        Telemetry: SR           No results found for: INR     Recent Labs   Lab  10/08/17   1157  10/08/17   1443  10/08/17   1923   TROP  0.33*  0.27*  0.28*       ECHO:  1. Left ventricle:  The cavity size was normal. W

## 2017-10-10 NOTE — TELEPHONE ENCOUNTER
[facility] called states MISTY RUVIVIANA from 9-20-17 is AFB positive (is pigmented so 99.9% will not be TB), will send out for ID today, ID should be back by early next week.

## 2017-10-10 NOTE — CONSULTS
[facility]    Report of Consultation    Konstantin Hahn Patient Status:  Inpatient    10/8/1945 MRN [id number]   Location [facility] 3W/SW Attending Jacklyn Frausto MD   Hosp Day # 2 PCP Chet Bates MD     Date of Admiss Surgical History  Past Surgical History:  1995, 1993: ANGIOPLASTY (CORONARY)  No date: ANGIOPLASTY (CORONARY)  1997: CABG      Comment: x2  3/21/16: CATARACT EXTRACTION W/  INTRAOCULAR LENS IMPLA* Right      Comment: KEN with Propofol drip; significant mov (PF) 2 MG/ML injection 1 mg 1 mg Intravenous Q2H PRN   Or      morphINE sulfate (PF) 2 MG/ML injection 2 mg 2 mg Intravenous Q2H PRN   Or      morphINE sulfate (PF) 4 MG/ML injection 4 mg 4 mg Intravenous Q2H PRN   Zolpidem Tartrate (AMBIEN) tab 5 mg 5 mg daily.   Cholecalciferol (VITAMIN D) 2000 units Oral Tab Take 2,000 Units by mouth daily. Prochlorperazine Maleate (COMPAZINE) 10 mg tablet Take 1 tablet (10 mg total) by mouth every 6 (six) hours as needed for Nausea.    guaiFENesin 400 MG Oral Tab Take vomiting, abdominal pain  : denies dysuria, hematuria  Musculoskeletal: denies arthralgia, myalgia  Integumentary: denies rash, itching  Neurological: denies syncope, weakness, dizziness,   Psychiatric: denies depression, anxiety  Hematologic: denies bru and subsegmental pulmonary arteries due to respiratory motion artifact. Dilated main pulmonary artery can be seen with pulmonary hypertension.    Interval increase in size of a dominant right posterior medial upper lobe mass with invasion of the right supr

## 2017-10-10 NOTE — CM/SW NOTE
10/10/17 CM Discharge planning   Pt resides with wife, family home reports independent prior to admission. Discharge planing discussed, pt declined [place] [phone] service at this time. Home going needs pending medical course.   Lucila Sauceda  [phone]

## 2017-10-10 NOTE — PHYSICAL THERAPY NOTE
PHYSICAL THERAPY EVALUATION - INPATIENT     Room Number: 307/307-A  Evaluation Date: 10/10/2017  Type of Evaluation: Initial  Physician Order: PT Eval and Treat    Presenting Problem: hypomagnesemia  Reason for Therapy: Mobility Dysfunction and Dischar Good  Frequency (Obs): 5x/week       PHYSICAL THERAPY MEDICAL/SOCIAL HISTORY   Problem List  Principal Problem:    Hypomagnesemia  Active Problems:    Atrial flutter, unspecified type (HCC)    Elevated troponin    Dyspnea on exertion    NSTEMI (non-ST elev SITUATION  Type of Home: House   Home Layout: One level  Stairs to Enter : 9  Railing: Yes  Stairs to Bedroom: 0  Railing: No    Lives With: Spouse (home alone during the day)  Drives: Yes  Patient Owned Equipment: Rolling walker;Cane  Patient Regularly Us Scale): 42.13   CMS Modifier (G-Code): CK    FUNCTIONAL ABILITY STATUS  Gait Assessment   Gait Assistance:  (CGA)  Distance (ft): 20ft x 1 and 30ft x 1  Assistive Device: Cane  Pattern: R Decreased stance time;L Decreased stance time  Stoop/Curb Assistance

## 2017-10-11 PROBLEM — I48.92 ATRIAL FLUTTER (HCC): Status: ACTIVE | Noted: 2017-01-01

## 2017-10-11 NOTE — PHYSICAL THERAPY NOTE
PHYSICAL THERAPY TREATMENT NOTE - INPATIENT    Room Number: 029/339-I       Presenting Problem: hypomagnesemia    Problem List  Principal Problem:    Hypomagnesemia  Active Problems:    Atrial flutter (HCC)    Elevated troponin    Dyspnea on exertion    N help from another person does the patient currently need. ..   -   Moving to and from a bed to a chair (including a wheelchair)?: A Little   -   Need to walk in hospital room?: A Little   -   Climbing 3-5 steps with a railing?: A Lot    AM-PAC Score:  Raw S Status  in progress, see above   Goal #6     Goal #6  Current Status

## 2017-10-11 NOTE — PROGRESS NOTES
[place] [facility]    Progress Note    Arnaldo Li Patient Status:  Inpatient    10/8/1945 MRN [id number]   Location [facility] 2W/SW Attending Vazquez Bhatt MD   Hosp Day # 2 PCP Edilson Queen MD       Subjective:     Erna Watts aspirin, currently off Plavix.   -Continue metoprolol. Advanced chronic obstructive pulmonary disease with a long history of smoking  Not on home oxygen, but might need it on discharge  -Continue Spiriva. No clear exacerbation.    -pt follows with Dr. Dav Aguilar malignancy. 3. More centrally located mass and or right hilar lymphadenopathy is inconspicuous secondary to patient rotation. 4. Right lower lobe pneumonia. 5. COPD/emphysema. 6. Post coronary artery bypass and stenting. No active CHF.                  JUNAID

## 2017-10-11 NOTE — PROGRESS NOTES
[address] Cardiology  Progress Note    Darlene Ernandez Patient Status:  Inpatient    10/8/1945 MRN [id number]   Location [facility] 2W/SW Attending Juana Ortega MD   Hosp Day # 3 PCP Tushar Chang MD     Impression:  1.  Toño Dunn mg Oral BID   insulin detemir (LEVEMIR) 100 UNIT/ML flextouch 20 Units 20 Units Subcutaneous [email or web address]   Normal Saline Flush 0.9 % injection 3 mL 3 mL Intravenous PRN   dextrose 50% injection 50 mL 50 mL Intravenous PRN   Glucose-Vitamin C (DEX-4) 4-0. nebulizer solution 0.5 mg 0.5 mg Nebulization Q4H PRN   amiodarone HCl (PACERONE) tab 400 mg 400 mg Oral 3x Daily Cardiac       Laboratory Data:        Telemetry: SR           No results found for: INR     Recent Labs   Lab  10/08/17   1157  10/08/17   144

## 2017-10-11 NOTE — CM/SW NOTE
SW met w/ pt to discuss eventual discharge plans. SW spoke w/ pt of PT recommendation of 24hr/sup/SNF. Pt stated he is agreeable w/ SNF, due to his wife working full time and will not be able to assist pt at home.  Pt stated he has a hx at [facility] and a

## 2017-10-11 NOTE — DISCHARGE SUMMARY
[facility] HOSPITALIST  DISCHARGE SUMMARY     Michelle Clinton Patient Status:  Inpatient    10/8/1945 MRN [id number]   Location [facility] 3W/SW Attending Star Tao MD   Hosp Day # 3 PCP Nadia Perez MD     DATE OF ADMISSION: 10/8/2017  DA respiratory distress though a significant portion is related to his malignancy and COPD. This portion will not improve in the short-term, and may only improve after successful treatment of the lung malignancy. Patient was placed on anticoagulation.   He w (120 mg total) by mouth daily. Quantity:  30 tablet  Refills:  0     Ipratropium Bromide 0.02 % Soln  Commonly known as:  ATROVENT      Take 2.5 mL (500 mcg total) by nebulization 3 (three) times daily.  And prn   Quantity:  100 Container  Refills:  0 0.4 MG Subl  Commonly known as:  NITROSTAT      Place 1 tablet (0.4 mg total) under the tongue every 5 (five) minutes as needed for Chest pain.  No  More than   3  Times - If not  Effective  To  Go to    ER   Quantity:  30 tablet  Refills:  0     Pantoprazo month      Ines Carmichael MD  [facility]  [phone]      You requested to make your own appointment. Please call Dr. Baldev Paris within 2-3 days to schedule Diabetes follow-up appointment in 1-2 weeks.     Lucie Melendrez MD  [phone]

## 2017-10-11 NOTE — PROGRESS NOTES
[place] [facility] - [facility]     Progress Note        Lucyann Form Patient Status:  Inpatient    10/8/1945 MRN [id number]   Location [facility] 3W/SW Attending Tushar Jack MD   Hosp Day # 3 PCP Chris Aguilar MD       Subjective:   Patient hydroxide (MILK OF MAGNESIA) 400 MG/5ML suspension 30 mL 30 mL Oral Daily PRN   bisacodyl (DULCOLAX) rectal suppository 10 mg 10 mg Rectal Daily PRN   FLEET ENEMA (FLEET) 7-19 GM/118ML enema 133 mL 1 enema Rectal Once PRN   ondansetron HCl (ZOFRAN) injecti Squamous cell lung cancer  3. Recent hemoptysis  4. Prior nicotine dependence  5. Coronary artery disease  5. Atrial flutter     Plan   -Patient with evidence of recent progressive dyspnea with exertion.   Underlying severe COPD with recent weight loss

## 2017-10-11 NOTE — PROGRESS NOTES
Patient was seen and examined. No complaints a present. Feels weak still and would benefit from rehab. Anxious to get started with immunotherapy. Discussed with Dr. Estela Randolph who will arrange for patient to be started within a week.    Discussed discharge plan

## 2017-10-11 NOTE — CM/SW NOTE
RN told SW that pt is cleared to discharge today and will need medicar transport. SW spoke w/ Detra Shone from [place], and confirmed they are able to accept pt to their SNF and will be ready for pt at 1600. Detra Shone asked SW if pt is on chemo or radiation.  Per R

## 2017-10-16 NOTE — PROGRESS NOTES
Post Chemo Documentation    Patient is here for treatment today, Cycle1, Day 1. 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     Arrives Ambulating with walker, SOB noted with ambulation      Accompanied by Spouse given. Cancer treatment education, including treatment plan, supportive medications, and post-treatment care, was provided to the patient.     The patient/support person was attentive during education, verbalized understanding, all questions were answere

## 2017-10-16 NOTE — PATIENT INSTRUCTIONS
Recommended Anti-nausea medications (as directed by your provider):  Prochloperazine (Compazine) 10 mg every 6 hours     When to call the doctor:  · Fever of 100.5 or greater or shaking chills  · Nausea/vomiting not controlled with anti-nausea medications: they can be washed. 3. Absorbable undergarments, or any other items contaminated with chemotherapy, should be placed in a sealed plastic bag for disposal, separate from other trash.   4. Toilets should be flushed twice with the lid closed while taking thi it does not touch any food. 4. The medication should remain in its original packaging until it is ready for ingestion.   Pill containers can be used but should not be used for other medications, and as few people as possible should come in contact with i tablets in 24 hours. MAY NEED STEROID TO CONTROL DIARRHEA. CALL OFFICE IF IMODIUM AD IS NOT WORKING.   · For Constipation:  Over-the-counter recommendations include Senokot, Ducolax, Milk of Magnesia and MiraLAX  · If you have persistent diarrhea or const

## 2017-10-18 NOTE — TELEPHONE ENCOUNTER
Called Cheyenne Reyes to see how he is feeling, NA, asked him to please call back if any questions, concerns, side effects, and that I'd be happy to assist him.

## 2017-11-03 ENCOUNTER — APPOINTMENT (OUTPATIENT)
Dept: HEMATOLOGY/ONCOLOGY | Facility: HOSPITAL | Age: 72
End: 2017-11-03
Attending: INTERNAL MEDICINE
Payer: MEDICARE

## 2017-11-06 ENCOUNTER — APPOINTMENT (OUTPATIENT)
Dept: HEMATOLOGY/ONCOLOGY | Facility: HOSPITAL | Age: 72
End: 2017-11-06
Attending: INTERNAL MEDICINE
Payer: MEDICARE

## 2017-11-08 NOTE — H&P
[address] Patient Status:  [facility] Outpatient Surgery    10/8/1945 MRN [id number]   Location [facility] ENDOSCOPY LAB SUITES Attending No att. providers found   Hosp Day # 0 PCP Fran Enriquez LENS IMPLA* Left      Comment: Dr. Syd Rodriguez @ [address]   No date: CATH [facility] ([facility])      Comment: x3  1995: CHOLECYSTECTOMY  2006, 2001: OTHER SURGICAL HISTORY      Comment: stent placed  2009: OTHER SURGICAL HISTORY      Comment: excision - chalazion deficits  Skin: warm, dry    Results:   Laboratory Data    Lab Results  Component Value Date   WBC 12.8 (H) 10/09/2017   HGB 10.1 (L) 10/09/2017   HCT 30.0 (L) 10/09/2017    (H) 10/09/2017   CREATSERUM 1.22 10/09/2017   BUN 18 10/09/2017    (L mass compatible with known malignancy. 3. More centrally located mass and or right hilar lymphadenopathy is inconspicuous secondary to patient rotation. 4. Right lower lobe pneumonia. 5. COPD/emphysema. 6. Post coronary artery bypass and stenting.  No activ

## 2017-11-27 ENCOUNTER — APPOINTMENT (OUTPATIENT)
Dept: HEMATOLOGY/ONCOLOGY | Facility: HOSPITAL | Age: 72
End: 2017-11-27
Attending: INTERNAL MEDICINE
Payer: MEDICARE

## 2019-08-05 NOTE — PROGRESS NOTES
NURSE ANTICOAGULATION VISIT    Leon Borrero 1959 presents to clinic today for 1 1/2 week INR appointment    No outpatient medications have been marked as taking for the 8/5/19 encounter (Anti-Coag) with [facility].       INR (no units)   Date Value   08/02/2019 2.9     INR-POC (no units)   Date Value   08/05/2019 3.0        GOAL RANGE: 2.0-3.0    ALLERGIES:   Allergen Reactions   • Aspirin ANAPHYLAXIS     THROAT CLOSE UP    • Metoprolol SHORTNESS OF BREATH     Patient states that any beta blocker causes asthma to flare up severely.    • Metformin DIARRHEA   • Piperacillin Sod-Tazobactam So RASH       Patient Active Problem List   Diagnosis   • CAD (coronary artery disease)   • HLD (hyperlipidemia)   • Asthma   • Pulmonary emboli (CMS/HCC)   • EUSEBIO (obstructive sleep apnea)   • H/O echocardiogram   • Allergic rhinitis   • Chronic sinusitis   • Aspirin-sensitive asthma with nasal polyps   • Coronary atherosclerosis of unspecified type of vessel, native or graft   • Unspecified gastritis and gastroduodenitis without mention of hemorrhage   • Chest pain, unspecified   • Nausea with vomiting   • Chest wall pain   • Pneumonia of lower lobe due to infectious organism (CMS/HCC)   • Systolic heart failure (CMS/HCC)   • Hyperglycemia   • Mural thrombus of heart   • Long term (current) use of anticoagulants   • Encounter for therapeutic drug monitoring   • COPD (chronic obstructive pulmonary disease) (CMS/HCC)   • Unstable angina (CMS/HCC)   • Acute exacerbation of chronic obstructive pulmonary disease (COPD) (CMS/HCC)   • Chronic combined systolic and diastolic CHF (congestive heart failure) (CMS/HCC)   • Ventricular thrombus following MI (myocardial infarction) (CMS/HCC)   • Other chronic pulmonary embolism   • Other chronic pulmonary embolism without acute cor pulmonale (CMS/HCC)   • Bronchitis   • COPD exacerbation (CMS/HCC)   • Essential hypertension   • LV dysfunction   • Ischemic cardiomyopathy   •  HPI:    Patient ID: Bernard Hale is a 80year old male. Diabetes  He presents for his follow-up diabetic visit. He has type 2 diabetes mellitus. Hypoglycemia symptoms include nervousness/anxiousness.  Pertinent negatives for hypoglycemia include no conf Rfl: 0   Metoprolol Succinate ER 25 MG Oral Tablet 24 Hr TAKE 1 TABLET (25MG) BY ORAL ROUTE EVERY DAY Disp: 90 tablet Rfl: 2   Tiotropium Bromide Monohydrate (SPIRIVA HANDIHALER) 18 MCG Inhalation Cap Inhale 1 capsule (18 mcg total) into the lungs daily.  D Type 2 diabetes mellitus, with long-term current use of insulin (CMS/HCC)   • Elevated troponin   • Chronic bronchitis with acute exacerbation (CMS/HCC)   • Acute on chronic respiratory failure with hypoxia (CMS/HCC)   • Hypokalemia   • Atrial flutter (CMS/HCC)   • Sepsis (CMS/HCC)   • Atrial fibrillation with rapid ventricular response (CMS/HCC)   • Supratherapeutic INR   • Ischemic dilated cardiomyopathy (CMS/HCC)   • High risk medication use       PATIENT REPORTED CHANGES: No changes with medications/diet or concerns currently per patient. Patient is s/p hospitalization from 08/01/19-08/02/19 for Afib with RVR. Also Rx Prednisone 40 mg daily started on 08/03/19. Patient also scheduled with Dr. Ro for Ablation and Dual AICD on 08/09/19.      NURSE DOSING ADJUSTMENTS AND EDUCATION:  Placed on hold for upcoming Ablation and Dual AICD placement on 08/09/19 with Dr. Ro. Per Dr. Ro Rx Warfarin, Plavix and Amiodarone on hold from now until procedure. Warfarin will be held for an addl week after procedure. Lovenox start 08/07. New dosage sheet provided.      Patient will recheck INR in 08/07/19, sooner if changes or concerns develop.  Warfarin Management done via face to face.  Reviewed signs and symptoms of bleeding and clotting with patient.  Reviewed and reinforced with patient the importance of calling the clinic with any medication, diet, and health related changes.  Importance of adherence to consistent diet in Vitamin K reviewed.  Affects of alcohol consumption and with concurrent use of Warfarin explained to patient.  Patient verbalized understanding.  Sent to Dr. Haywood for review and signature.        Evette Bragg RN   No JVD present. No thyromegaly present. Cardiovascular: Normal rate and normal heart sounds. No murmur heard. Pulmonary/Chest: Effort normal and breath sounds normal. No respiratory distress. He has no wheezes. He has no rales. Abdominal: Soft.  He Needed -  And to Using levamir  insulin  If  Needed     10-15 u   Qd only    Pt  Nervous today - cannot  discuses   Plan of  Care - he wants  To  Have labs  Done  And eat asap  Labs  todays     No orders of the defined types were placed in this encounter.
